# Patient Record
Sex: MALE | Race: WHITE | NOT HISPANIC OR LATINO
[De-identification: names, ages, dates, MRNs, and addresses within clinical notes are randomized per-mention and may not be internally consistent; named-entity substitution may affect disease eponyms.]

---

## 2017-06-11 ENCOUNTER — TRANSCRIPTION ENCOUNTER (OUTPATIENT)
Age: 37
End: 2017-06-11

## 2017-07-22 ENCOUNTER — TRANSCRIPTION ENCOUNTER (OUTPATIENT)
Age: 37
End: 2017-07-22

## 2018-10-12 ENCOUNTER — APPOINTMENT (OUTPATIENT)
Dept: INTERNAL MEDICINE | Facility: CLINIC | Age: 38
End: 2018-10-12

## 2018-10-12 ENCOUNTER — OUTPATIENT (OUTPATIENT)
Dept: OUTPATIENT SERVICES | Facility: HOSPITAL | Age: 38
LOS: 1 days | Discharge: HOME | End: 2018-10-12

## 2018-10-12 VITALS — HEART RATE: 65 BPM | TEMPERATURE: 97.8 F | DIASTOLIC BLOOD PRESSURE: 67 MMHG | SYSTOLIC BLOOD PRESSURE: 105 MMHG

## 2018-10-12 VITALS — HEIGHT: 70 IN | WEIGHT: 146 LBS | BODY MASS INDEX: 20.9 KG/M2

## 2018-10-12 DIAGNOSIS — Z78.9 OTHER SPECIFIED HEALTH STATUS: ICD-10-CM

## 2018-10-12 DIAGNOSIS — Z86.59 PERSONAL HISTORY OF OTHER MENTAL AND BEHAVIORAL DISORDERS: ICD-10-CM

## 2018-10-12 NOTE — PHYSICAL EXAM
[No Acute Distress] : no acute distress [Supple] : supple [No Respiratory Distress] : no respiratory distress  [Clear to Auscultation] : lungs were clear to auscultation bilaterally [No Accessory Muscle Use] : no accessory muscle use [Normal Rate] : normal rate  [Regular Rhythm] : with a regular rhythm [Normal S1, S2] : normal S1 and S2 [No Edema] : there was no peripheral edema [Soft] : abdomen soft [Non Tender] : non-tender [de-identified] : bilateral shin scratching lesions

## 2018-10-12 NOTE — REVIEW OF SYSTEMS
[Fever] : no fever [Chills] : no chills [Nasal Discharge] : no nasal discharge [Sore Throat] : no sore throat [Chest Pain] : no chest pain [Palpitations] : no palpitations [Lower Ext Edema] : no lower extremity edema [Shortness Of Breath] : no shortness of breath [Cough] : no cough [Dyspnea on Exertion] : not dyspnea on exertion [Abdominal Pain] : no abdominal pain [Nausea] : no nausea [Constipation] : no constipation [Diarrhea] : no diarrhea [Vomiting] : no vomiting [Heartburn] : no heartburn [Melena] : no melena [Dysuria] : no dysuria [Hematuria] : no hematuria [Itching] : itching [Negative] : Neurological [de-identified] : skin lesions on lower extr.

## 2018-10-12 NOTE — ASSESSMENT
[FreeTextEntry1] : 38 y.o male patient with PMH of mental retardation and no other issues presents to the office for a routine exam.\par \par #Pt has MR, paperwork needed for program\par - Check cbc, cmp, lipid profile, quantiferon, urinalysis, hepatitis B serology\par - Mother instructed to come back in 1 week to complete form\par \par # Skin lesions on bilateral shins\par - Patient scratches a lot\par - Will start clobetasole cream\par \par # Health maintenance\par - Patient refused flu vaccine\par - Patient up to date on tetanus vaccine

## 2018-10-15 LAB
ALBUMIN SERPL ELPH-MCNC: 5 G/DL
ALP BLD-CCNC: 89 U/L
ALT SERPL-CCNC: 11 U/L
ANION GAP SERPL CALC-SCNC: 15 MMOL/L
APPEARANCE: CLEAR
AST SERPL-CCNC: 17 U/L
BASOPHILS # BLD AUTO: 0.02 K/UL
BASOPHILS NFR BLD AUTO: 0.6 %
BILIRUB SERPL-MCNC: 0.6 MG/DL
BILIRUBIN URINE: NEGATIVE
BLOOD URINE: NEGATIVE
BUN SERPL-MCNC: 9 MG/DL
CALCIUM SERPL-MCNC: 9.6 MG/DL
CHLORIDE SERPL-SCNC: 101 MMOL/L
CHOLEST SERPL-MCNC: 163 MG/DL
CHOLEST/HDLC SERPL: 3.5 RATIO
CO2 SERPL-SCNC: 26 MMOL/L
COLOR: YELLOW
CREAT SERPL-MCNC: 0.8 MG/DL
EOSINOPHIL # BLD AUTO: 0.04 K/UL
EOSINOPHIL NFR BLD AUTO: 1.2 %
GLUCOSE QUALITATIVE U: NEGATIVE MG/DL
GLUCOSE SERPL-MCNC: 89 MG/DL
HBV CORE IGG+IGM SER QL: NONREACTIVE
HBV SURFACE AB SER QL: REACTIVE
HCT VFR BLD CALC: 44.2 %
HDLC SERPL-MCNC: 47 MG/DL
HGB BLD-MCNC: 15 G/DL
IMM GRANULOCYTES NFR BLD AUTO: 0 %
KETONES URINE: NEGATIVE
LDLC SERPL CALC-MCNC: 110 MG/DL
LEUKOCYTE ESTERASE URINE: NEGATIVE
LYMPHOCYTES # BLD AUTO: 1.04 K/UL
LYMPHOCYTES NFR BLD AUTO: 31.4 %
MAN DIFF?: NORMAL
MCHC RBC-ENTMCNC: 31.1 PG
MCHC RBC-ENTMCNC: 33.9 G/DL
MCV RBC AUTO: 91.5 FL
MONOCYTES # BLD AUTO: 0.35 K/UL
MONOCYTES NFR BLD AUTO: 10.6 %
NEUTROPHILS # BLD AUTO: 1.86 K/UL
NEUTROPHILS NFR BLD AUTO: 56.2 %
NITRITE URINE: NEGATIVE
PH URINE: 7
PLATELET # BLD AUTO: 238 K/UL
POTASSIUM SERPL-SCNC: 4.2 MMOL/L
PROT SERPL-MCNC: 7.7 G/DL
PROTEIN URINE: NEGATIVE MG/DL
RBC # BLD: 4.83 M/UL
RBC # FLD: 13.6 %
SODIUM SERPL-SCNC: 142 MMOL/L
SPECIFIC GRAVITY URINE: 1.01
TRIGL SERPL-MCNC: 77 MG/DL
UROBILINOGEN URINE: 0.2 MG/DL (ref 0.2–?)
WBC # FLD AUTO: 3.31 K/UL

## 2018-10-15 RX ORDER — CLOBETASOL PROPIONATE 0.5 MG/G
0.05 CREAM TOPICAL TWICE DAILY
Qty: 1 | Refills: 1 | Status: DISCONTINUED | COMMUNITY
Start: 2018-10-12 | End: 2018-10-15

## 2018-10-15 RX ORDER — HALOBETASOL PROPIONATE 0.5 MG/G
0.05 CREAM TOPICAL TWICE DAILY
Qty: 1 | Refills: 3 | Status: ACTIVE | COMMUNITY
Start: 2018-10-15 | End: 1900-01-01

## 2018-10-19 ENCOUNTER — OUTPATIENT (OUTPATIENT)
Dept: OUTPATIENT SERVICES | Facility: HOSPITAL | Age: 38
LOS: 1 days | Discharge: HOME | End: 2018-10-19

## 2018-10-19 ENCOUNTER — APPOINTMENT (OUTPATIENT)
Dept: INTERNAL MEDICINE | Facility: CLINIC | Age: 38
End: 2018-10-19

## 2018-10-19 VITALS
TEMPERATURE: 96.8 F | HEIGHT: 70 IN | WEIGHT: 149 LBS | BODY MASS INDEX: 21.33 KG/M2 | SYSTOLIC BLOOD PRESSURE: 110 MMHG | DIASTOLIC BLOOD PRESSURE: 73 MMHG

## 2018-10-19 DIAGNOSIS — L98.9 DISORDER OF THE SKIN AND SUBCUTANEOUS TISSUE, UNSPECIFIED: ICD-10-CM

## 2018-10-19 DIAGNOSIS — F79 UNSPECIFIED INTELLECTUAL DISABILITIES: ICD-10-CM

## 2018-10-19 NOTE — PHYSICAL EXAM
[No Acute Distress] : no acute distress [Well Nourished] : well nourished [Normal Sclera/Conjunctiva] : normal sclera/conjunctiva [PERRL] : pupils equal round and reactive to light [Normal Outer Ear/Nose] : the outer ears and nose were normal in appearance [Supple] : supple [No Respiratory Distress] : no respiratory distress  [Clear to Auscultation] : lungs were clear to auscultation bilaterally [Normal Rate] : normal rate  [Regular Rhythm] : with a regular rhythm [Normal S1, S2] : normal S1 and S2 [No Edema] : there was no peripheral edema [No Extremity Clubbing/Cyanosis] : no extremity clubbing/cyanosis [Soft] : abdomen soft [Non Tender] : non-tender [No Joint Swelling] : no joint swelling [Grossly Normal Strength/Tone] : grossly normal strength/tone [Normal Gait] : normal gait [Coordination Grossly Intact] : coordination grossly intact [de-identified] : mildly distended [de-identified] : rash+ left LE- reddish-purple discoloration, no discharge [de-identified] : mental retardation

## 2018-10-19 NOTE — HISTORY OF PRESENT ILLNESS
[FreeTextEntry1] : follow up to fill form [de-identified] : 39 yo male with PMHx of mental retardation , comes for a follow up for the paperwork to be completed to be enrolled in the 'special program'. \par No new complaints today.

## 2018-10-19 NOTE — ASSESSMENT
[FreeTextEntry1] : 38 y.o male patient with PMH of mental retardation and no other issues presents to the office for a routine exam.\par \par #Pt has MR, paperwork needed for program\par -cbc, cmp, lipid profile checked\par -hep b surface ab -reactive; core ab nonreactive-immune to hep B\par -quantiferon test wasn't done by lab;ordered again\par \par # Skin lesions on bilateral shins\par -better than before-not itchy anymore\par - c/w halobetazole cream\par \par # Health maintenance\par - Patient refused flu vaccine\par - Patient up to date on tetanus vaccine. \par

## 2018-10-22 LAB
M TB IFN-G BLD-IMP: NEGATIVE
QUANTIFERON TB PLUS MITOGEN MINUS NIL: >10 IU/ML
QUANTIFERON TB PLUS NIL: 0.05 IU/ML
QUANTIFERON TB PLUS TB1 MINUS NIL: -0.02 IU/ML
QUANTIFERON TB PLUS TB2 MINUS NIL: -0.02 IU/ML

## 2019-02-10 ENCOUNTER — EMERGENCY (EMERGENCY)
Facility: HOSPITAL | Age: 39
LOS: 0 days | Discharge: HOME | End: 2019-02-10
Attending: EMERGENCY MEDICINE

## 2019-02-10 VITALS
OXYGEN SATURATION: 97 % | DIASTOLIC BLOOD PRESSURE: 62 MMHG | HEART RATE: 119 BPM | RESPIRATION RATE: 20 BRPM | TEMPERATURE: 96 F | SYSTOLIC BLOOD PRESSURE: 95 MMHG

## 2019-02-10 LAB
ALBUMIN SERPL ELPH-MCNC: 4.3 G/DL — SIGNIFICANT CHANGE UP (ref 3.5–5.2)
ALP SERPL-CCNC: 80 U/L — SIGNIFICANT CHANGE UP (ref 30–115)
ALT FLD-CCNC: 11 U/L — SIGNIFICANT CHANGE UP (ref 0–41)
ANION GAP SERPL CALC-SCNC: 22 MMOL/L — HIGH (ref 7–14)
ANISOCYTOSIS BLD QL: SLIGHT — SIGNIFICANT CHANGE UP
AST SERPL-CCNC: 24 U/L — SIGNIFICANT CHANGE UP (ref 0–41)
BASOPHILS # BLD AUTO: 0 K/UL — SIGNIFICANT CHANGE UP (ref 0–0.2)
BASOPHILS NFR BLD AUTO: 0 % — SIGNIFICANT CHANGE UP (ref 0–1)
BILIRUB SERPL-MCNC: 1.2 MG/DL — SIGNIFICANT CHANGE UP (ref 0.2–1.2)
BUN SERPL-MCNC: 31 MG/DL — HIGH (ref 10–20)
CALCIUM SERPL-MCNC: 9.1 MG/DL — SIGNIFICANT CHANGE UP (ref 8.5–10.1)
CHLORIDE SERPL-SCNC: 93 MMOL/L — LOW (ref 98–110)
CO2 SERPL-SCNC: 23 MMOL/L — SIGNIFICANT CHANGE UP (ref 17–32)
CREAT SERPL-MCNC: 2.2 MG/DL — HIGH (ref 0.7–1.5)
EOSINOPHIL # BLD AUTO: 0 K/UL — SIGNIFICANT CHANGE UP (ref 0–0.7)
EOSINOPHIL NFR BLD AUTO: 0 % — SIGNIFICANT CHANGE UP (ref 0–8)
GIANT PLATELETS BLD QL SMEAR: PRESENT — SIGNIFICANT CHANGE UP
GLUCOSE SERPL-MCNC: 94 MG/DL — SIGNIFICANT CHANGE UP (ref 70–99)
HCT VFR BLD CALC: 42.6 % — SIGNIFICANT CHANGE UP (ref 42–52)
HGB BLD-MCNC: 15.5 G/DL — SIGNIFICANT CHANGE UP (ref 14–18)
LACTATE SERPL-SCNC: 3.2 MMOL/L — HIGH (ref 0.5–2.2)
LYMPHOCYTES # BLD AUTO: 0 % — LOW (ref 20.5–51.1)
LYMPHOCYTES # BLD AUTO: 0 K/UL — LOW (ref 1.2–3.4)
MANUAL SMEAR VERIFICATION: SIGNIFICANT CHANGE UP
MCHC RBC-ENTMCNC: 31.6 PG — HIGH (ref 27–31)
MCHC RBC-ENTMCNC: 36.4 G/DL — SIGNIFICANT CHANGE UP (ref 32–37)
MCV RBC AUTO: 86.9 FL — SIGNIFICANT CHANGE UP (ref 80–94)
MICROCYTES BLD QL: SLIGHT — SIGNIFICANT CHANGE UP
MONOCYTES # BLD AUTO: 0.24 K/UL — SIGNIFICANT CHANGE UP (ref 0.1–0.6)
MONOCYTES NFR BLD AUTO: 1.7 % — SIGNIFICANT CHANGE UP (ref 1.7–9.3)
NEUTROPHILS # BLD AUTO: 14.05 K/UL — HIGH (ref 1.4–6.5)
NEUTROPHILS NFR BLD AUTO: 98.3 % — HIGH (ref 42.2–75.2)
NRBC # BLD: 0 /100 WBCS — SIGNIFICANT CHANGE UP (ref 0–0)
PLAT MORPH BLD: NORMAL — SIGNIFICANT CHANGE UP
PLATELET # BLD AUTO: 172 K/UL — SIGNIFICANT CHANGE UP (ref 130–400)
POLYCHROMASIA BLD QL SMEAR: SLIGHT — SIGNIFICANT CHANGE UP
POTASSIUM SERPL-MCNC: 4 MMOL/L — SIGNIFICANT CHANGE UP (ref 3.5–5)
POTASSIUM SERPL-SCNC: 4 MMOL/L — SIGNIFICANT CHANGE UP (ref 3.5–5)
PROT SERPL-MCNC: 7.3 G/DL — SIGNIFICANT CHANGE UP (ref 6–8)
RBC # BLD: 4.9 M/UL — SIGNIFICANT CHANGE UP (ref 4.7–6.1)
RBC # FLD: 13 % — SIGNIFICANT CHANGE UP (ref 11.5–14.5)
RBC BLD AUTO: NORMAL — SIGNIFICANT CHANGE UP
SODIUM SERPL-SCNC: 138 MMOL/L — SIGNIFICANT CHANGE UP (ref 135–146)
WBC # BLD: 14.29 K/UL — HIGH (ref 4.8–10.8)
WBC # FLD AUTO: 14.29 K/UL — HIGH (ref 4.8–10.8)

## 2019-02-10 RX ORDER — ONDANSETRON 8 MG/1
4 TABLET, FILM COATED ORAL ONCE
Qty: 0 | Refills: 0 | Status: COMPLETED | OUTPATIENT
Start: 2019-02-10 | End: 2019-02-10

## 2019-02-10 RX ORDER — SODIUM CHLORIDE 9 MG/ML
2000 INJECTION, SOLUTION INTRAVENOUS ONCE
Qty: 0 | Refills: 0 | Status: COMPLETED | OUTPATIENT
Start: 2019-02-10 | End: 2019-02-10

## 2019-02-10 RX ORDER — FAMOTIDINE 10 MG/ML
20 INJECTION INTRAVENOUS ONCE
Qty: 0 | Refills: 0 | Status: COMPLETED | OUTPATIENT
Start: 2019-02-10 | End: 2019-02-10

## 2019-02-10 RX ORDER — ACETAMINOPHEN 500 MG
650 TABLET ORAL ONCE
Qty: 0 | Refills: 0 | Status: COMPLETED | OUTPATIENT
Start: 2019-02-10 | End: 2019-02-10

## 2019-02-10 RX ADMIN — ONDANSETRON 4 MILLIGRAM(S): 8 TABLET, FILM COATED ORAL at 21:20

## 2019-02-10 RX ADMIN — FAMOTIDINE 104 MILLIGRAM(S): 10 INJECTION INTRAVENOUS at 21:20

## 2019-02-10 RX ADMIN — SODIUM CHLORIDE 2000 MILLILITER(S): 9 INJECTION, SOLUTION INTRAVENOUS at 21:20

## 2019-02-10 RX ADMIN — Medication 650 MILLIGRAM(S): at 21:20

## 2019-02-10 NOTE — ED PROVIDER NOTE - OBJECTIVE STATEMENT
37 yo male with no significant PMHx ? mental disability presents for vomiting, watery diarrhea x 2 days. Patient's mother at bedside giving history. Patient has not been able to tolerate PO and has been gagging with minimal vomiting. Patient has been having continuing diarrhea as well. (+) fevers, sore throat, generalized weakness. Patient/family denies chest pain, SOB, abdominal pain, dizziness, leg pain/swelling, urinary sx, cough, congestion, changes in urine output, changes in mental status.

## 2019-02-10 NOTE — ED PROVIDER NOTE - NSFOLLOWUPINSTRUCTIONS_ED_ALL_ED_FT
Follow up with your primary care doctor in 48 hours for re-evaluation and further treatment.    Dehydration, Adult    Dehydration is a condition in which you do not have enough fluid or water in your body. It happens when you take in less fluid than you lose. Vital organs such as the kidneys, brain, and heart cannot function without a proper amount of fluids. Any loss of fluids from the body can cause dehydration.     Dehydration can range from mild to severe. This condition should be treated right away to help prevent it from becoming severe.     CAUSES  This condition may be caused by:    Vomiting.  Diarrhea.  Excessive sweating, such as when exercising in hot or humid weather.  Not drinking enough fluid during strenuous exercise or during an illness.  Excessive urine output.  Fever.   Certain medicines.    RISK FACTORS  This condition is more likely to develop in:    People who are taking certain medicines that cause the body to lose excess fluid (diuretics).    People who have a chronic illness, such as diabetes, that may increase urination.   Older adults.    People who live at high altitudes.    People who participate in endurance sports.      SYMPTOMS  Mild Dehydration    Thirst.  Dry lips.  Slightly dry mouth.  Dry, warm skin.     Moderate Dehydration    Very dry mouth.    Muscle cramps.    Dark urine and decreased urine production.    Decreased tear production.    Headache.    Light-headedness, especially when you stand up from a sitting position.      Severe Dehydration    Changes in skin.    Cold and clammy skin.    Skin does not spring back quickly when lightly pinched and released.    Changes in body fluids.    Extreme thirst.    No tears.    Not able to sweat when body temperature is high, such as in hot weather.    Minimal urine production.    Changes in vital signs.    Rapid, weak pulse (more than 100 beats per minute when you are sitting still).    Rapid breathing.    Low blood pressure.    Other changes.    Sunken eyes.    Cold hands and feet.    Confusion.   Lethargy and difficulty being awakened.   Fainting (syncope).    Short-term weight loss.    Unconsciousness.     DIAGNOSIS  This condition may be diagnosed based on your symptoms. You may also have tests to determine how severe your dehydration is. These tests may include:     Urine tests.    Blood tests.      TREATMENT  Treatment for this condition depends on the severity. Mild or moderate dehydration can often be treated at home. Treatment should be started right away. Do not wait until dehydration becomes severe. Severe dehydration needs to be treated at the hospital.    Treatment for Mild Dehydration    Drinking plenty of water to replace the fluid you have lost.    Replacing minerals in your blood (electrolytes) that you may have lost.      Treatment for Moderate Dehydration     Consuming oral rehydration solution (ORS).     Treatment for Severe Dehydration    Receiving fluid through an IV tube.    Receiving electrolyte solution through a feeding tube that is passed through your nose and into your stomach (nasogastric tube or NG tube).  Correcting any abnormalities in electrolytes.     HOME CARE INSTRUCTIONS  Drink enough fluid to keep your urine clear or pale yellow.    Drink water or fluid slowly by taking small sips. You can also try sucking on ice cubes.   Have food or beverages that contain electrolytes. Examples include bananas and sports drinks.  Take over-the-counter and prescription medicines only as told by your health care provider.    Prepare ORS according to the 's instructions. Take sips of ORS every 5 minutes until your urine returns to normal.  If you have vomiting or diarrhea, continue to try to drink water, ORS, or both.    If you have diarrhea, avoid:    Beverages that contain caffeine.    Fruit juice.    Milk.     Carbonated soft drinks.  Do not take salt tablets. This can lead to the condition of having too much sodium in your body (hypernatremia).      SEEK MEDICAL CARE IF:  You cannot eat or drink without vomiting.   You have had moderate diarrhea during a period of more than 24 hours.   You have a fever.    SEEK IMMEDIATE MEDICAL CARE IF:  You have extreme thirst.  You have severe diarrhea.   You have not urinated in 6–8 hours, or you have urinated only a small amount of very dark urine.  You have shriveled skin.  You are dizzy, confused, or both.    ADDITIONAL NOTES AND INSTRUCTIONS    Please follow up with your Primary MD in 24-48 hr.  Seek immediate medical care for any new/worsening signs or symptoms.     Nausea / Vomiting    Nausea is the feeling that you have an upset stomach or have to vomit. As nausea gets worse, it can lead to vomiting. Vomiting occurs when stomach contents are thrown up and out of the mouth which puts you at an increased risk for dehydration. Older adults and people with other diseases or a weak immune system are at higher risk for dehydration. Drink clear fluids in small but frequent amounts as tolerated. Eat bland, easy-to-digest foods in small amounts as tolerated.     SEEK IMMEDIATE MEDICAL CARE IF YOU HAVE THE FOLLOWING SYMPTOMS: fever, inability to keep fluids down, black or bloody vomitus, black or bloody stools, lightheadedness/dizziness, chest pain, severe headache, rash, shortness of breath, cold or clammy skin, confusion, pain with urination, or any signs of dehydration.

## 2019-02-10 NOTE — ED PROVIDER NOTE - PROGRESS NOTE DETAILS
Patient mother stated that patient is feeling lot better, and they want to go home. Patient mother stated that patient is not going to be able to stay in the hospital, states that he has been asking to go home, states he does not do well in the hospitals since it is new environment. Patient mother also stated that she cannot stay with him in the hospital due to her other  needs, patient kept extending his arm and attempting to pull out the IV while I was in the room taking to them, patient mother stated that it will be lot difficult to keep him in the hospital since he does not do well in the hospitals. Patient mother also stated that not only she care for another child, she also has her own health issues, is due to take her elaquis and other meds, states she needs to go home for her meds and to her other child, so she cannot stay in the hospital, and as per her, patient will not do well in the hospital. I have told them that I will have the sitter (1:1 observation) from our hospital stay and I will take care of the patient, and I advised patient mother to go home and take care of her health and her other  needs. Patient mother stated that patient will get agitated and won't stay in the hospital, so she want to take him home. Patient mother stated that she signs all the papers for patient for any of his needs, she is his mother, legal guardian and care giver, and she wanted to sign him out and take home. I have discussed with patient and his mother in detail that he has low BP, tachycardic, elevated Lactate with JACKSON, and I have educated them on the need for admission and further medical care, patient continued to attempting to pull out the hep lock in spite of encouraging him not to do that, and patient verbalized understanding the information and still wanted to sign out AMA. I have advised them to come back in AM to ED again for reevaluation and repeat labs, they verbalized understanding and stated will either come to ED for go to their doctor.

## 2019-02-10 NOTE — ED ADULT NURSE NOTE - OBJECTIVE STATEMENT
Pt is non-verbal at baseline due to cognitive deficit as per mother who states she brought pt in for multiple vomiting episodes

## 2019-02-10 NOTE — ED ADULT NURSE NOTE - NSIMPLEMENTINTERV_GEN_ALL_ED
Implemented All Universal Safety Interventions:  [place] to call system. Call bell, personal items and telephone within reach. Instruct patient to call for assistance. Room bathroom lighting operational. Non-slip footwear when patient is off stretcher. Physically safe environment: no spills, clutter or unnecessary equipment. Stretcher in lowest position, wheels locked, appropriate side rails in place.

## 2019-02-10 NOTE — ED PROVIDER NOTE - NS ED ROS FT
Constitutional: (+) fevers/chills.    Head: No injury, headache.    Eyes:  No eye pain or discharge. No injury.    ENMT:  No pain, discharge or infections. No neck pain or stiffness. No loss ROM.    Cardiac:  No chest pain, SOB or edema.    Respiratory:  No cough or respiratory distress.     GI:  (+) nausea, vomiting, diarrhea (-) abdominal pain. Decreased PO intake.    :  No frequency, urgency or burning. No change in urine output.    MS:  No leg pain, leg swelling, myalgia, muscle weakness, joint pain or back pain. No loss ROM.    Neuro:  No dizziness (+) generalized weakness.  No LOC.    Skin:  No skin rash.

## 2019-02-10 NOTE — ED PROVIDER NOTE - ATTENDING CONTRIBUTION TO CARE
patient is BIB mother who is next of kin and as per her, is legal guardian and care giver. Patient mother stated that 2 days ago patient started having nausea/vomiting/diarrhea; several episodes of watery diarrhea, no blood/mucous/pus in the stool, denies vomiting blood. NO travel, no sick contacts, subjective fever, no recent abx use.   vitals noted  patient is awake, alert, able to follow simple commands, able to answer simple questions by nodding head, as per patient mother, patient mental status is at his baseline.   +dry mucosa  lungs: CTA, no wheezing/crackles  Abd: +BS, NT, ND, soft  CNS: awake, alert, at his baseline  A/P: AGE  labs, IVF, symptomatic treatment  reevaluation

## 2019-02-10 NOTE — ED PROVIDER NOTE - PHYSICAL EXAMINATION
GEN: Well appearing, in no apparent distress. Slender.    HEAD:  Normocephalic, atraumatic.    EYES:  Clear conjunctivae without injection, drainage or discharge.    ENMT:  Dry MM.    NECK:  Supple, no masses. Normal ROM.    CARDIAC:  Tachycardic with regular rhythm, normal S1 and S2, no murmurs, rubs or gallops.    RESP:  Respiratory rate and effort appear normal; lungs are clear to auscultation bilaterally; no rhonchi, rales or wheezes.    ABDOMEN:  Soft, non-tender, non-distended, no masses. Normal BS throughout.    MUSCULOSKELETAL: No leg swelling, no calf tenderness. Patient able to ambulate without difficulty.  NEURO:  Patietn acting at baseline as per mother. Follows commands and answers questions with yes and no.     SKIN:  Normal skin color for age and race, well-perfused; warm and dry.

## 2019-02-11 ENCOUNTER — INPATIENT (INPATIENT)
Facility: HOSPITAL | Age: 39
LOS: 6 days | Discharge: HOME | End: 2019-02-18
Attending: HOSPITALIST | Admitting: HOSPITALIST
Payer: MEDICARE

## 2019-02-11 VITALS
OXYGEN SATURATION: 100 % | DIASTOLIC BLOOD PRESSURE: 59 MMHG | SYSTOLIC BLOOD PRESSURE: 97 MMHG | HEART RATE: 104 BPM | RESPIRATION RATE: 18 BRPM

## 2019-02-11 VITALS
HEART RATE: 122 BPM | DIASTOLIC BLOOD PRESSURE: 63 MMHG | OXYGEN SATURATION: 99 % | TEMPERATURE: 99 F | RESPIRATION RATE: 18 BRPM | SYSTOLIC BLOOD PRESSURE: 99 MMHG

## 2019-02-11 DIAGNOSIS — F79 UNSPECIFIED INTELLECTUAL DISABILITIES: ICD-10-CM

## 2019-02-11 DIAGNOSIS — E86.0 DEHYDRATION: ICD-10-CM

## 2019-02-11 DIAGNOSIS — S80.822A BLISTER (NONTHERMAL), LEFT LOWER LEG, INITIAL ENCOUNTER: ICD-10-CM

## 2019-02-11 DIAGNOSIS — E87.2 ACIDOSIS: ICD-10-CM

## 2019-02-11 DIAGNOSIS — R65.20 SEVERE SEPSIS WITHOUT SEPTIC SHOCK: ICD-10-CM
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
## 2019-02-11 DIAGNOSIS — L97.929 NON-PRESSURE CHRONIC ULCER OF UNSPECIFIED PART OF LEFT LOWER LEG WITH UNSPECIFIED SEVERITY: ICD-10-CM

## 2019-02-11 DIAGNOSIS — R00.0 TACHYCARDIA, UNSPECIFIED: ICD-10-CM

## 2019-02-11 DIAGNOSIS — L03.116 CELLULITIS OF LEFT LOWER LIMB: ICD-10-CM

## 2019-02-11 DIAGNOSIS — A41.9 SEPSIS, UNSPECIFIED ORGANISM: ICD-10-CM
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
## 2019-02-11 DIAGNOSIS — R11.10 VOMITING, UNSPECIFIED: ICD-10-CM

## 2019-02-11 DIAGNOSIS — E83.42 HYPOMAGNESEMIA: ICD-10-CM

## 2019-02-11 DIAGNOSIS — N17.9 ACUTE KIDNEY FAILURE, UNSPECIFIED: ICD-10-CM

## 2019-02-11 DIAGNOSIS — J02.0 STREPTOCOCCAL PHARYNGITIS: ICD-10-CM

## 2019-02-11 DIAGNOSIS — I95.9 HYPOTENSION, UNSPECIFIED: ICD-10-CM

## 2019-02-11 LAB
ALBUMIN SERPL ELPH-MCNC: 3.6 G/DL — SIGNIFICANT CHANGE UP (ref 3.5–5.2)
ALP SERPL-CCNC: 63 U/L — SIGNIFICANT CHANGE UP (ref 30–115)
ALT FLD-CCNC: 12 U/L — SIGNIFICANT CHANGE UP (ref 0–41)
ANION GAP SERPL CALC-SCNC: 20 MMOL/L — HIGH (ref 7–14)
AST SERPL-CCNC: 37 U/L — SIGNIFICANT CHANGE UP (ref 0–41)
BASE EXCESS BLDV CALC-SCNC: 1.9 MMOL/L — SIGNIFICANT CHANGE UP (ref -2–2)
BASOPHILS # BLD AUTO: 0 K/UL — SIGNIFICANT CHANGE UP (ref 0–0.2)
BASOPHILS NFR BLD AUTO: 0 % — SIGNIFICANT CHANGE UP (ref 0–1)
BILIRUB SERPL-MCNC: 0.9 MG/DL — SIGNIFICANT CHANGE UP (ref 0.2–1.2)
BUN SERPL-MCNC: 26 MG/DL — HIGH (ref 10–20)
CA-I SERPL-SCNC: 1.1 MMOL/L — LOW (ref 1.12–1.3)
CALCIUM SERPL-MCNC: 8.2 MG/DL — LOW (ref 8.5–10.1)
CHLORIDE SERPL-SCNC: 91 MMOL/L — LOW (ref 98–110)
CO2 SERPL-SCNC: 21 MMOL/L — SIGNIFICANT CHANGE UP (ref 17–32)
CREAT SERPL-MCNC: 1.4 MG/DL — SIGNIFICANT CHANGE UP (ref 0.7–1.5)
EOSINOPHIL # BLD AUTO: 0 K/UL — SIGNIFICANT CHANGE UP (ref 0–0.7)
EOSINOPHIL NFR BLD AUTO: 0 % — SIGNIFICANT CHANGE UP (ref 0–8)
GAS PNL BLDV: 135 MMOL/L — LOW (ref 136–145)
GAS PNL BLDV: SIGNIFICANT CHANGE UP
GLUCOSE SERPL-MCNC: 106 MG/DL — HIGH (ref 70–99)
HCO3 BLDV-SCNC: 27 MMOL/L — SIGNIFICANT CHANGE UP (ref 22–29)
HCT VFR BLDA CALC: 42.5 % — SIGNIFICANT CHANGE UP (ref 34–44)
HGB BLD CALC-MCNC: 13.9 G/DL — LOW (ref 14–18)
LACTATE BLDV-MCNC: 2.1 MMOL/L — HIGH (ref 0.5–1.6)
LACTATE SERPL-SCNC: 2 MMOL/L — SIGNIFICANT CHANGE UP (ref 0.5–2.2)
LYMPHOCYTES # BLD AUTO: 0.12 K/UL — LOW (ref 1.2–3.4)
LYMPHOCYTES # BLD AUTO: 0.9 % — LOW (ref 20.5–51.1)
MANUAL SMEAR VERIFICATION: SIGNIFICANT CHANGE UP
MONOCYTES # BLD AUTO: 0.23 K/UL — SIGNIFICANT CHANGE UP (ref 0.1–0.6)
MONOCYTES NFR BLD AUTO: 1.7 % — SIGNIFICANT CHANGE UP (ref 1.7–9.3)
NEUTROPHILS # BLD AUTO: 12.96 K/UL — HIGH (ref 1.4–6.5)
NEUTROPHILS NFR BLD AUTO: 93.9 % — HIGH (ref 42.2–75.2)
NEUTS BAND # BLD: 2.6 % — SIGNIFICANT CHANGE UP (ref 0–6)
PCO2 BLDV: 43 MMHG — SIGNIFICANT CHANGE UP (ref 41–51)
PH BLDV: 7.4 — SIGNIFICANT CHANGE UP (ref 7.26–7.43)
PLAT MORPH BLD: NORMAL — SIGNIFICANT CHANGE UP
PO2 BLDV: 20 MMHG — SIGNIFICANT CHANGE UP (ref 20–40)
POTASSIUM BLDV-SCNC: 3.9 MMOL/L — SIGNIFICANT CHANGE UP (ref 3.3–5.6)
POTASSIUM SERPL-MCNC: 4.2 MMOL/L — SIGNIFICANT CHANGE UP (ref 3.5–5)
POTASSIUM SERPL-SCNC: 4.2 MMOL/L — SIGNIFICANT CHANGE UP (ref 3.5–5)
PROT SERPL-MCNC: 6.2 G/DL — SIGNIFICANT CHANGE UP (ref 6–8)
RBC BLD AUTO: NORMAL — SIGNIFICANT CHANGE UP
SAO2 % BLDV: 41 % — SIGNIFICANT CHANGE UP
SODIUM SERPL-SCNC: 132 MMOL/L — LOW (ref 135–146)
VARIANT LYMPHS # BLD: 0.9 % — SIGNIFICANT CHANGE UP (ref 0–5)
WBC # BLD: 13.43 K/UL — HIGH (ref 4.8–10.8)
WBC # FLD AUTO: 13.43 K/UL — HIGH (ref 4.8–10.8)

## 2019-02-11 RX ORDER — ACETAMINOPHEN 500 MG
650 TABLET ORAL ONCE
Qty: 0 | Refills: 0 | Status: COMPLETED | OUTPATIENT
Start: 2019-02-11 | End: 2019-02-11

## 2019-02-11 RX ORDER — VANCOMYCIN HCL 1 G
750 VIAL (EA) INTRAVENOUS EVERY 12 HOURS
Qty: 0 | Refills: 0 | Status: DISCONTINUED | OUTPATIENT
Start: 2019-02-11 | End: 2019-02-13

## 2019-02-11 RX ORDER — DEXAMETHASONE 0.5 MG/5ML
10 ELIXIR ORAL ONCE
Qty: 0 | Refills: 0 | Status: COMPLETED | OUTPATIENT
Start: 2019-02-11 | End: 2019-02-11

## 2019-02-11 RX ORDER — SODIUM CHLORIDE 9 MG/ML
2000 INJECTION, SOLUTION INTRAVENOUS ONCE
Refills: 0 | Status: COMPLETED | OUTPATIENT
Start: 2019-02-11 | End: 2019-02-11

## 2019-02-11 RX ORDER — HEPARIN SODIUM 5000 [USP'U]/ML
5000 INJECTION INTRAVENOUS; SUBCUTANEOUS EVERY 12 HOURS
Qty: 0 | Refills: 0 | Status: DISCONTINUED | OUTPATIENT
Start: 2019-02-11 | End: 2019-02-18

## 2019-02-11 RX ORDER — PANTOPRAZOLE SODIUM 20 MG/1
40 TABLET, DELAYED RELEASE ORAL
Qty: 0 | Refills: 0 | Status: DISCONTINUED | OUTPATIENT
Start: 2019-02-11 | End: 2019-02-18

## 2019-02-11 RX ORDER — ONDANSETRON 8 MG/1
4 TABLET, FILM COATED ORAL EVERY 6 HOURS
Qty: 0 | Refills: 0 | Status: DISCONTINUED | OUTPATIENT
Start: 2019-02-11 | End: 2019-02-18

## 2019-02-11 RX ORDER — SODIUM CHLORIDE 9 MG/ML
1000 INJECTION INTRAMUSCULAR; INTRAVENOUS; SUBCUTANEOUS ONCE
Qty: 0 | Refills: 0 | Status: COMPLETED | OUTPATIENT
Start: 2019-02-11 | End: 2019-02-11

## 2019-02-11 RX ORDER — NYSTATIN 500MM UNIT
500000 POWDER (EA) MISCELLANEOUS
Qty: 0 | Refills: 0 | Status: DISCONTINUED | OUTPATIENT
Start: 2019-02-11 | End: 2019-02-18

## 2019-02-11 RX ORDER — DEXAMETHASONE 0.5 MG/5ML
4 ELIXIR ORAL EVERY 8 HOURS
Qty: 0 | Refills: 0 | Status: DISCONTINUED | OUTPATIENT
Start: 2019-02-11 | End: 2019-02-13

## 2019-02-11 RX ORDER — ACETAMINOPHEN 500 MG
650 TABLET ORAL EVERY 6 HOURS
Qty: 0 | Refills: 0 | Status: DISCONTINUED | OUTPATIENT
Start: 2019-02-11 | End: 2019-02-18

## 2019-02-11 RX ORDER — ONDANSETRON 8 MG/1
4 TABLET, FILM COATED ORAL ONCE
Refills: 0 | Status: COMPLETED | OUTPATIENT
Start: 2019-02-11 | End: 2019-02-11

## 2019-02-11 RX ORDER — SODIUM CHLORIDE 9 MG/ML
2000 INJECTION, SOLUTION INTRAVENOUS ONCE
Qty: 0 | Refills: 0 | Status: COMPLETED | OUTPATIENT
Start: 2019-02-11 | End: 2019-02-11

## 2019-02-11 RX ORDER — SODIUM CHLORIDE 9 MG/ML
1000 INJECTION, SOLUTION INTRAVENOUS
Qty: 0 | Refills: 0 | Status: DISCONTINUED | OUTPATIENT
Start: 2019-02-11 | End: 2019-02-13

## 2019-02-11 RX ORDER — AMPICILLIN SODIUM AND SULBACTAM SODIUM 250; 125 MG/ML; MG/ML
3 INJECTION, POWDER, FOR SUSPENSION INTRAMUSCULAR; INTRAVENOUS ONCE
Qty: 0 | Refills: 0 | Status: COMPLETED | OUTPATIENT
Start: 2019-02-11 | End: 2019-02-11

## 2019-02-11 RX ORDER — MEROPENEM 1 G/30ML
1000 INJECTION INTRAVENOUS EVERY 12 HOURS
Qty: 0 | Refills: 0 | Status: DISCONTINUED | OUTPATIENT
Start: 2019-02-11 | End: 2019-02-13

## 2019-02-11 RX ADMIN — AMPICILLIN SODIUM AND SULBACTAM SODIUM 200 GRAM(S): 250; 125 INJECTION, POWDER, FOR SUSPENSION INTRAMUSCULAR; INTRAVENOUS at 20:46

## 2019-02-11 RX ADMIN — ONDANSETRON 4 MILLIGRAM(S): 8 TABLET, FILM COATED ORAL at 18:49

## 2019-02-11 RX ADMIN — Medication 10 MILLIGRAM(S): at 23:40

## 2019-02-11 RX ADMIN — SODIUM CHLORIDE 2000 MILLILITER(S): 9 INJECTION, SOLUTION INTRAVENOUS at 18:48

## 2019-02-11 RX ADMIN — SODIUM CHLORIDE 2000 MILLILITER(S): 9 INJECTION, SOLUTION INTRAVENOUS at 20:20

## 2019-02-11 RX ADMIN — Medication 650 MILLIGRAM(S): at 20:10

## 2019-02-11 NOTE — H&P ADULT - ENMT COMMENTS
erythematous/ edematous tonsils and uvula with white patches, patent airway, +oral thrush, no trismus

## 2019-02-11 NOTE — ED PROVIDER NOTE - OBJECTIVE STATEMENT
37yo M with PMHx intellectual disability 39yo M with PMHx intellectual disability, brought to ED by family for eval for fever. Patient was seen in ED yesterday for vomiting, diarrhea, dehydration, tachycardic and hypotensive at that time. Per provider note had wanted to admit patient but family declined stating he has needed 1:1 before and does not do well in hospitals. Today patient continued to worsen, family took pt to [facility], did rapid strep test which was positive, but told family to go to ED again for tachycardia and hypotension. Patient unable to provide history and ROS 2/2 intellectual disability, history is per mother. Vomiting today has resolved but pt continuing to have poor PO intake and now has cough. Also notes chronic rash to left leg.

## 2019-02-11 NOTE — H&P ADULT - ASSESSMENT
39 yo M with intellectual disability p/w fever, sore throat, vomiting and decreased po intake     - Severe sepsis 2/2 strept pharyngitis and LLE cellulitis  ICU monitoring  IV abxs: vancomycin and clindamycin  IV hydration  start levophed if MAP <65 mmHg  fu blood cultures and de escalate accordingly  nystatin oral solution  decadron for uvular edema     - JACKSON likely 2/2 dehydration  crea 2.2 - 1.4      baseline 0.8  IVFs  avoid nephrotoxic medications        - Vomiting  zofran prn  full liquid diet then advance as tolerated     - GI/ DVT ppx     - Full code as per mother 39 yo M with intellectual disability p/w fever, sore throat, vomiting and decreased po intake     - Severe sepsis 2/2 strept pharyngitis and LLE cellulitis  ICU monitoring  IV abxs: vancomycin and clindamycin  IV hydration  start levophed if MAP <65 mmHg  fu blood cultures and de escalate accordingly  nystatin oral solution  decadron for uvular edema  burn consult for LLE blister      - JACKSON likely 2/2 dehydration  crea 2.2 - 1.4      baseline 0.8  IVFs  avoid nephrotoxic medications      - Vomiting  zofran prn  full liquid diet then advance as tolerated     - GI/ DVT ppx     - Full code

## 2019-02-11 NOTE — H&P ADULT - HISTORY OF PRESENT ILLNESS
37 yo M with intellectual disability brought in by family from home for hypotension and fever.  Pt was seen 1 day prior in the ED for vomiting and dehydration. he was diagnosed with strep pharyngitis.  pt was seen earlier today in  and was advised to come to ED given his low BP and tachycardia.  In Ed. tmax 101.9,  and ADALBERTO 80s/50s, given 4L IVFs and unasyn  Mother over phone deny any chills, complaints of sore throat, + intermittent cough, + decreased PO intake, + vomiting  + L leg skin rash since Oct that failed local creams 2/2 persistent scratching by pt

## 2019-02-11 NOTE — ED PROVIDER NOTE - PROGRESS NOTE DETAILS
d/w family. Last time pt had to be admitted, needed 1:1 as he became restless and agitated. Spoke with Pulm fellow Dr. Sd Mederos, to be admit to ICU. Will place 2nd 18G IV as pt likely unable to tolerate CVC placement without sedation.

## 2019-02-11 NOTE — ED PROVIDER NOTE - CARE PLAN
Principal Discharge DX:	Sepsis  Secondary Diagnosis:	Uvulitis  Secondary Diagnosis:	Pharyngitis Principal Discharge DX:	Sepsis  Secondary Diagnosis:	Uvulitis  Secondary Diagnosis:	Pharyngitis  Secondary Diagnosis:	Cellulitis

## 2019-02-11 NOTE — ED PROVIDER NOTE - NS ED ROS FT
Constitutional: +fever  ENMT:  No neck pain  Cardiac:  No chest pain  Respiratory:  +cough. No SOB  GI:  +vomiting, diarrhea. No abdominal pain.  :  No dysuria  MS:  No back pain.  Neuro:  No headache  Skin:  +rash  Endocrine: No history of thyroid disease or diabetes.  Except as documented in the HPI,  all other systems are negative.

## 2019-02-11 NOTE — ED PROVIDER NOTE - PHYSICAL EXAMINATION
Vital signs reviewed  GENERAL: Patient nontoxic appearing, NAD  HEAD: NCAT  EYES: Anicteric  ENT: MMM. Pharyngeal erythema with exudates. Edematous and enlarged uvula.   NECK: Supple, non tender  RESPIRATORY: Normal respiratory effort. CTA B/L. No wheezing, rales, rhonchi  CARDIOVASCULAR: Regular rate and rhythm  ABDOMEN: Soft. Nondistended. Nontender. No guarding or rebound. No CVA tenderness.  MUSCULOSKELETAL/EXTREMITIES: Brisk cap refill. 2+ radial pulses.  SKIN:  LLE blister  NEURO: Awake, alert. Speaking but does not answer questions coherently. Moving all extremities.   PSYCHIATRIC: Cooperative. Affect appropriate.

## 2019-02-11 NOTE — ED ADULT NURSE NOTE - OBJECTIVE STATEMENT
Pt sent in from [facility] for positive strep test and dehydration. Pt was seen in ED yesterday for same symptoms and left AMA and followed up in [facility] today. Pt mom and sister endorses pt has had fevers, chills, nausea, and decreased PO intake.

## 2019-02-11 NOTE — H&P ADULT - NSHPLABSRESULTS_GEN_ALL_CORE
13.4   13.43 )-----------( 143      ( 11 Feb 2019 18:09 )             37.5     02-11    132<L>  |  91<L>  |  26<H>  ----------------------------<  106<H>                crea 2.2  feb 10  4.2   |  21  |  1.4    Ca    8.2<L>      11 Feb 2019 18:09    TPro  6.2  /  Alb  3.6  /  TBili  0.9  /  DBili  x   /  AST  37  /  ALT  12  /  AlkPhos  63  02-11    Lactate, Blood: 2.0 mmol/L (02.11.19 @ 18:09)    Blood Gas Profile - Venous (02.10.19 @ 22:29)    pH, Venous: 7.40    pCO2, Venous: 43 mmHg    pO2, Venous: 20 mmHg    HCO3, Venous: 27 mmoL/L    Base Excess, Venous: 1.9 mmoL/L    Oxygen Saturation, Venous: 41 %    CXR: no opacities/ effusions ( official report)    bl cx: Pending    urine cx/ UA pending

## 2019-02-11 NOTE — ED PROVIDER NOTE - ATTENDING CONTRIBUTION TO CARE
38 year old male, pmhx of developmental delay, is bib mother for evaluation of weakness, sore throat and congestion, patient here yesterday, family decided to sign out ama. Patient is not a reliable historian. I am unable to obtain a comprehensive history, review of systems, past medical history, and/or physical exam due to constraints imposed by the urgency of the patient's clinical condition and/or mental status. Family bedside providing history.     CONSTITUTIONAL: Well-developed; well-nourished; in no acute distress. Sitting up, permitting Physical exam  SKIN: skin exam is warm and dry, no acute rash.  HEAD: Normocephalic; atraumatic.  EYES: PERRL, 3 mm bilateral, no nystagmus, EOM intact; conjunctiva and sclera clear.  ENT: + Pharyngeal erythema, no exudate, no edema, no pooling of secretions, + clear bilateral nasal discharge; airway clear.  NECK: Supple; non tender. + full passive ROM in all directions. No JVD  CARD: S1, S2 normal; no murmurs, gallops, or rubs. Regular rate and rhythm. + Symmetric Strong Pulses  RESP: No wheezes, rales or rhonchi. Good air movement bilaterally  ABD: soft; non-distended; non-tender. No Rebound, No Guarding, No signs of peritonitis, No CVA tenderness. No pulsatile abdominal mass. + Strong and Symmetric Pulses  EXT: Normal ROM. No clubbing, cyanosis or edema. Dp and Pt Pulses intact. Cap refill less than 3 seconds  NEURO: , Alert, mentating at baseline, grossly unremarkable. No Focal deficits. GCS 15. NIH 0

## 2019-02-11 NOTE — ED PROVIDER NOTE - CRITICAL CARE PROVIDED
interpretation of diagnostic studies/consultation with other physicians/documentation/consult w/ pt's family directly relating to pts condition/direct patient care (not related to procedure)

## 2019-02-11 NOTE — ED PROVIDER NOTE - MEDICAL DECISION MAKING DETAILS
I personally evaluated the patient. I reviewed the Resident’s or Physician Assistant’s note (as assigned above), and agree with the findings and plan except as documented in my note. Long discussion had with ICU fellow, family and ICU resident. Patient is agitated and will not tolerate an invasive procedure, family would like to avoid central access at this time, ICU team amenable to plan with careful watching, patient has bilateral 18 G IV, patient remains extremely well appearing clinically, patient HR much improved with fluids, accepted to the ICU, ICU residnet bedside evaluating patient, given antibiotics

## 2019-02-11 NOTE — H&P ADULT - EXTREMITIES COMMENTS
L leg 3x4cm blister with surrounding erythema, + TTP, no pus, no ulcers noted, pt refused full extremity exam

## 2019-02-12 ENCOUNTER — TRANSCRIPTION ENCOUNTER (OUTPATIENT)
Age: 39
End: 2019-02-12

## 2019-02-12 DIAGNOSIS — Z02.9 ENCOUNTER FOR ADMINISTRATIVE EXAMINATIONS, UNSPECIFIED: ICD-10-CM

## 2019-02-12 PROBLEM — R11.10 VOMITING, UNSPECIFIED: Chronic | Status: ACTIVE | Noted: 2019-02-10

## 2019-02-12 LAB
ANION GAP SERPL CALC-SCNC: 17 MMOL/L — HIGH (ref 7–14)
APPEARANCE UR: CLEAR — SIGNIFICANT CHANGE UP
BASOPHILS # BLD AUTO: 0.02 K/UL — SIGNIFICANT CHANGE UP (ref 0–0.2)
BASOPHILS NFR BLD AUTO: 0.2 % — SIGNIFICANT CHANGE UP (ref 0–1)
BILIRUB UR-MCNC: NEGATIVE — SIGNIFICANT CHANGE UP
BUN SERPL-MCNC: 17 MG/DL — SIGNIFICANT CHANGE UP (ref 10–20)
CALCIUM SERPL-MCNC: 8.2 MG/DL — LOW (ref 8.5–10.1)
CHLORIDE SERPL-SCNC: 97 MMOL/L — LOW (ref 98–110)
CO2 SERPL-SCNC: 24 MMOL/L — SIGNIFICANT CHANGE UP (ref 17–32)
COLOR SPEC: YELLOW — SIGNIFICANT CHANGE UP
CREAT SERPL-MCNC: 1 MG/DL — SIGNIFICANT CHANGE UP (ref 0.7–1.5)
DIFF PNL FLD: ABNORMAL
EOSINOPHIL # BLD AUTO: 0 K/UL — SIGNIFICANT CHANGE UP (ref 0–0.7)
EOSINOPHIL NFR BLD AUTO: 0 % — SIGNIFICANT CHANGE UP (ref 0–8)
GLUCOSE SERPL-MCNC: 119 MG/DL — HIGH (ref 70–99)
GLUCOSE UR QL: NEGATIVE MG/DL — SIGNIFICANT CHANGE UP
HCT VFR BLD CALC: 32.2 % — LOW (ref 42–52)
HGB BLD-MCNC: 11.8 G/DL — LOW (ref 14–18)
IMM GRANULOCYTES NFR BLD AUTO: 0.8 % — HIGH (ref 0.1–0.3)
KETONES UR-MCNC: ABNORMAL
LACTATE SERPL-SCNC: 1.1 MMOL/L — SIGNIFICANT CHANGE UP (ref 0.5–2.2)
LEUKOCYTE ESTERASE UR-ACNC: NEGATIVE — SIGNIFICANT CHANGE UP
LYMPHOCYTES # BLD AUTO: 0.13 K/UL — LOW (ref 1.2–3.4)
LYMPHOCYTES # BLD AUTO: 1.2 % — LOW (ref 20.5–51.1)
MAGNESIUM SERPL-MCNC: 1.4 MG/DL — LOW (ref 1.8–2.4)
MCHC RBC-ENTMCNC: 31.1 PG — HIGH (ref 27–31)
MCHC RBC-ENTMCNC: 36.6 G/DL — SIGNIFICANT CHANGE UP (ref 32–37)
MCV RBC AUTO: 84.7 FL — SIGNIFICANT CHANGE UP (ref 80–94)
MONOCYTES # BLD AUTO: 0.22 K/UL — SIGNIFICANT CHANGE UP (ref 0.1–0.6)
MONOCYTES NFR BLD AUTO: 2 % — SIGNIFICANT CHANGE UP (ref 1.7–9.3)
NEUTROPHILS # BLD AUTO: 10.48 K/UL — HIGH (ref 1.4–6.5)
NEUTROPHILS NFR BLD AUTO: 95.8 % — HIGH (ref 42.2–75.2)
NITRITE UR-MCNC: NEGATIVE — SIGNIFICANT CHANGE UP
NRBC # BLD: 0 /100 WBCS — SIGNIFICANT CHANGE UP (ref 0–0)
PH UR: 6 — SIGNIFICANT CHANGE UP (ref 5–8)
PLATELET # BLD AUTO: 112 K/UL — LOW (ref 130–400)
POTASSIUM SERPL-MCNC: 3.6 MMOL/L — SIGNIFICANT CHANGE UP (ref 3.5–5)
POTASSIUM SERPL-SCNC: 3.6 MMOL/L — SIGNIFICANT CHANGE UP (ref 3.5–5)
PROT UR-MCNC: ABNORMAL MG/DL
RBC # BLD: 3.8 M/UL — LOW (ref 4.7–6.1)
RBC # FLD: 12.5 % — SIGNIFICANT CHANGE UP (ref 11.5–14.5)
RBC CASTS # UR COMP ASSIST: ABNORMAL /HPF
SODIUM SERPL-SCNC: 138 MMOL/L — SIGNIFICANT CHANGE UP (ref 135–146)
SP GR SPEC: 1.01 — SIGNIFICANT CHANGE UP (ref 1.01–1.03)
UROBILINOGEN FLD QL: 1 MG/DL (ref 0.2–0.2)
WBC # BLD: 10.94 K/UL — HIGH (ref 4.8–10.8)
WBC # FLD AUTO: 10.94 K/UL — HIGH (ref 4.8–10.8)

## 2019-02-12 PROCEDURE — 93970 EXTREMITY STUDY: CPT | Mod: 26

## 2019-02-12 RX ORDER — MAGNESIUM SULFATE 500 MG/ML
2 VIAL (ML) INJECTION ONCE
Qty: 0 | Refills: 0 | Status: COMPLETED | OUTPATIENT
Start: 2019-02-12 | End: 2019-02-12

## 2019-02-12 RX ORDER — CHLORHEXIDINE GLUCONATE 213 G/1000ML
1 SOLUTION TOPICAL
Qty: 0 | Refills: 0 | Status: DISCONTINUED | OUTPATIENT
Start: 2019-02-12 | End: 2019-02-18

## 2019-02-12 RX ADMIN — Medication 4 MILLIGRAM(S): at 13:34

## 2019-02-12 RX ADMIN — Medication 250 MILLIGRAM(S): at 17:10

## 2019-02-12 RX ADMIN — Medication 500000 UNIT(S): at 17:00

## 2019-02-12 RX ADMIN — CHLORHEXIDINE GLUCONATE 1 APPLICATION(S): 213 SOLUTION TOPICAL at 11:46

## 2019-02-12 RX ADMIN — Medication 4 MILLIGRAM(S): at 05:05

## 2019-02-12 RX ADMIN — HEPARIN SODIUM 5000 UNIT(S): 5000 INJECTION INTRAVENOUS; SUBCUTANEOUS at 17:00

## 2019-02-12 RX ADMIN — MEROPENEM 100 MILLIGRAM(S): 1 INJECTION INTRAVENOUS at 05:03

## 2019-02-12 RX ADMIN — SODIUM CHLORIDE 100 MILLILITER(S): 9 INJECTION, SOLUTION INTRAVENOUS at 17:01

## 2019-02-12 RX ADMIN — Medication 500000 UNIT(S): at 05:04

## 2019-02-12 RX ADMIN — Medication 250 MILLIGRAM(S): at 05:41

## 2019-02-12 RX ADMIN — SODIUM CHLORIDE 100 MILLILITER(S): 9 INJECTION, SOLUTION INTRAVENOUS at 21:04

## 2019-02-12 RX ADMIN — Medication 500000 UNIT(S): at 23:20

## 2019-02-12 RX ADMIN — Medication 500000 UNIT(S): at 11:43

## 2019-02-12 RX ADMIN — Medication 4 MILLIGRAM(S): at 21:04

## 2019-02-12 RX ADMIN — PANTOPRAZOLE SODIUM 40 MILLIGRAM(S): 20 TABLET, DELAYED RELEASE ORAL at 08:11

## 2019-02-12 RX ADMIN — Medication 50 GRAM(S): at 08:12

## 2019-02-12 RX ADMIN — MEROPENEM 100 MILLIGRAM(S): 1 INJECTION INTRAVENOUS at 17:10

## 2019-02-12 NOTE — MEDICAL STUDENT PROGRESS NOTE(EDUCATION) - SUBJECTIVE AND OBJECTIVE BOX
SUBJECTIVE:    Patient is a 38y old Male who presents with a chief complaint of hypotension (2019 08:40)    Currently admitted to medicine with the primary diagnosis of Sepsis     Today is hospital day 1d. This morning he is resting comfortably in bed. Due to patient's disability, full subjective evaluation cannot be ascertained. Patient reports no new pains but continues to endorse pain in lower extremities bilaterally, worse on the LLE, and cough.     PAST MEDICAL & SURGICAL HISTORY  Vomiting, intractability of vomiting not specified, presence of nausea not specified, unspecified vomiting type  Intellectual disability  No significant past surgical history      MEDICATIONS:  STANDING MEDICATIONS  chlorhexidine 4% Liquid 1 Application(s) Topical <User Schedule>  dexamethasone  Injectable 4 milliGRAM(s) IV Push every 8 hours  heparin  Injectable 5000 Unit(s) SubCutaneous every 12 hours  lactated ringers. 1000 milliLiter(s) IV Continuous <Continuous>  meropenem  IVPB 1000 milliGRAM(s) IV Intermittent every 12 hours  nystatin    Suspension 559600 Unit(s) Oral four times a day  pantoprazole    Tablet 40 milliGRAM(s) Oral before breakfast  vancomycin  IVPB 750 milliGRAM(s) IV Intermittent every 12 hours    PRN MEDICATIONS  acetaminophen   Tablet .. 650 milliGRAM(s) Oral every 6 hours PRN  ondansetron Injectable 4 milliGRAM(s) IV Push every 6 hours PRN    VITALS:   T(F): 98  HR: 104  BP: 105/66  RR: 21  SpO2: 97%    LABS:                        11.8   10.94 )-----------( 112      ( 2019 04:35 )             32.2     02-12    138  |  97<L>  |  17  ----------------------------<  119<H>  3.6   |  24  |  1.0    Ca    8.2<L>      2019 04:35  Mg     1.4     02-12    TPro  6.2  /  Alb  3.6  /  TBili  0.9  /  DBili  x   /  AST  37  /  ALT  12  /  AlkPhos  63  02-11      Urinalysis Basic - ( 2019 01:00 )    Color: Yellow / Appearance: Clear / S.010 / pH: x  Gluc: x / Ketone: Trace  / Bili: Negative / Urobili: 1.0 mg/dL   Blood: x / Protein: Trace mg/dL / Nitrite: Negative   Leuk Esterase: Negative / RBC: 6-10 /HPF / WBC x   Sq Epi: x / Non Sq Epi: x / Bacteria: x        Lactate, Blood: 1.1 mmol/L (19 @ 04:35)  Lactate, Blood: 2.0 mmol/L (19 @ 18:09)          RADIOLOGY:    PHYSICAL EXAM:  GEN: No acute distress, comfortable,   HEENT: Erythematous posterior pharyngeal mucosa, postnasal drip  LUNGS: Clear to auscultation bilaterally   HEART: S1S2, RRR, no MRG  ABD: Soft, non-tender, non-distended.  EXT: LE tender b/l worse on left, erythematous and edematous lesion on LLE with excoriations  NEURO: AAOX1, not oriented to time or place    Intravenous access: PIV SUBJECTIVE:    Patient is a 38y old Male who presents with a chief complaint of hypotension (2019 08:40)    Currently admitted to medicine with the primary diagnosis of Sepsis     Today is hospital day 1d. This morning he is resting comfortably in bed. Due to patient's disability, full subjective evaluation cannot be ascertained. Patient reports no new pains but continues to endorse pain in lower extremities bilaterally, worse on the LLE, and cough.     PAST MEDICAL & SURGICAL HISTORY  Vomiting, intractability of vomiting not specified, presence of nausea not specified, unspecified vomiting type  Intellectual disability  No significant past surgical history      MEDICATIONS:  STANDING MEDICATIONS  chlorhexidine 4% Liquid 1 Application(s) Topical <User Schedule>  dexamethasone  Injectable 4 milliGRAM(s) IV Push every 8 hours  heparin  Injectable 5000 Unit(s) SubCutaneous every 12 hours  lactated ringers. 1000 milliLiter(s) IV Continuous <Continuous>  meropenem  IVPB 1000 milliGRAM(s) IV Intermittent every 12 hours  nystatin    Suspension 843500 Unit(s) Oral four times a day  pantoprazole    Tablet 40 milliGRAM(s) Oral before breakfast  vancomycin  IVPB 750 milliGRAM(s) IV Intermittent every 12 hours    PRN MEDICATIONS  acetaminophen   Tablet .. 650 milliGRAM(s) Oral every 6 hours PRN  ondansetron Injectable 4 milliGRAM(s) IV Push every 6 hours PRN    VITALS:   T(F): 98  HR: 104  BP: 105/66  RR: 21  SpO2: 97%    LABS:                        11.8   10.94 )-----------( 112      ( 2019 04:35 )             32.2     02-12    138  |  97<L>  |  17  ----------------------------<  119<H>  3.6   |  24  |  1.0    Ca    8.2<L>      2019 04:35  Mg     1.4     02-12    TPro  6.2  /  Alb  3.6  /  TBili  0.9  /  DBili  x   /  AST  37  /  ALT  12  /  AlkPhos  63  02-11      Urinalysis Basic - ( 2019 01:00 )    Color: Yellow / Appearance: Clear / S.010 / pH: x  Gluc: x / Ketone: Trace  / Bili: Negative / Urobili: 1.0 mg/dL   Blood: x / Protein: Trace mg/dL / Nitrite: Negative   Leuk Esterase: Negative / RBC: 6-10 /HPF / WBC x   Sq Epi: x / Non Sq Epi: x / Bacteria: x        Lactate, Blood: 1.1 mmol/L (19 @ 04:35)  Lactate, Blood: 2.0 mmol/L (19 @ 18:09)          RADIOLOGY:    CXR : No radiographic evidence of acute cardiopulmonary disease.      PHYSICAL EXAM:  GEN: No acute distress, comfortable,   HEENT: Erythematous posterior pharyngeal mucosa, postnasal drip  LUNGS: Clear to auscultation bilaterally   HEART: S1S2, RRR, no MRG  ABD: Soft, non-tender, non-distended.  EXT: LE tender b/l worse on left, erythematous and edematous lesion on LLE with excoriations  NEURO: AAOX1, not oriented to time or place    Intravenous access: PIV SUBJECTIVE:    Patient is a 38y old Male who presents with a chief complaint of hypotension (2019 08:40)    Currently admitted to medicine with the primary diagnosis of Sepsis     Today is hospital day 1d. This morning he is resting comfortably in bed. Due to patient's disability, full subjective evaluation cannot be ascertained. Patient reports no new pains but noted to be tender in lower extremities bilaterally, worse on the LLE, and cough.     PAST MEDICAL & SURGICAL HISTORY  Intellectual disability  No significant past surgical history      MEDICATIONS:  STANDING MEDICATIONS  chlorhexidine 4% Liquid 1 Application(s) Topical <User Schedule>  dexamethasone  Injectable 4 milliGRAM(s) IV Push every 8 hours  heparin  Injectable 5000 Unit(s) SubCutaneous every 12 hours  lactated ringers. 1000 milliLiter(s) IV Continuous <Continuous>  meropenem  IVPB 1000 milliGRAM(s) IV Intermittent every 12 hours  nystatin    Suspension 671422 Unit(s) Oral four times a day  pantoprazole    Tablet 40 milliGRAM(s) Oral before breakfast  vancomycin  IVPB 750 milliGRAM(s) IV Intermittent every 12 hours    PRN MEDICATIONS  acetaminophen   Tablet .. 650 milliGRAM(s) Oral every 6 hours PRN  ondansetron Injectable 4 milliGRAM(s) IV Push every 6 hours PRN    VITALS:   T(F): 98  HR: 104  BP: 105/66  RR: 21  SpO2: 97%    LABS:                        11.8   10.94 )-----------( 112      ( 2019 04:35 )             32.2     02-12    138  |  97<L>  |  17  ----------------------------<  119<H>  3.6   |  24  |  1.0    Ca    8.2<L>      2019 04:35  Mg     1.4     02-12    TPro  6.2  /  Alb  3.6  /  TBili  0.9  /  DBili  x   /  AST  37  /  ALT  12  /  AlkPhos  63  02-11      Urinalysis Basic - ( 2019 01:00 )    Color: Yellow / Appearance: Clear / S.010 / pH: x  Gluc: x / Ketone: Trace  / Bili: Negative / Urobili: 1.0 mg/dL   Blood: x / Protein: Trace mg/dL / Nitrite: Negative   Leuk Esterase: Negative / RBC: 6-10 /HPF / WBC x   Sq Epi: x / Non Sq Epi: x / Bacteria: x        Lactate, Blood: 1.1 mmol/L (19 @ 04:35)  Lactate, Blood: 2.0 mmol/L (19 @ 18:09)          RADIOLOGY:    CXR : No radiographic evidence of acute cardiopulmonary disease.      PHYSICAL EXAM:  GEN: No acute distress, comfortable,   HEENT: Erythematous posterior pharyngeal mucosa, small white patchy areas noted, no perfuse discharge   LUNGS: Clear to auscultation bilaterally   HEART: S1S2, RRR, no MRG  ABD: Soft, non-tender, non-distended.  EXT: LE tender b/l worse on left, erythematous, edematous with bullous lesion on LLE with excoriations, no purulent discharge   NEURO: AAOX1, not oriented to time or place    Intravenous access: PIV

## 2019-02-12 NOTE — CHART NOTE - NSCHARTNOTEFT_GEN_A_CORE
CCU Transfer Note    Transfer from: CCU    Transfer to: ( x ) Medicine    (  ) Telemetry    (  ) RCU                               (  ) Palliative    (  ) Stroke Unit    (  ) MICU    (  ) __________________    Accepting Physician: Med floor    Signout given to:     HPI / CCU COURSE:  37 yo M with intellectual disability brought in by mother from home for poor PO intake and fever. Patient seen in ED 2/10 for vomiting, dehydration. In H&P states strep pharyngitis but unable to find any results for strep+ swab. At that time mother stated she had other children to take care of and her own health to monitor, so discharged AMA. Patient noted then to have leukocytosis and lactic acid 2.1 but still d/c'd home. [facility] [place] [facility] and was told to come to ED given low BP and tachy. In ED pt was febrile to 101.9 and tachy to 123 with BP 80s/50s. Got x4L IVF and unasyn, started on cefepime and bhupendra. Mother continued to deny any chills, but does have sore throat, intermittent cough, decreased PO intake, and some vomiting. Also noted to have a L leg skin rash since Oct that failed local cream treatment 2/2 pt persistently scratching.     Admitted overnight to CCU for monitoring of breathing and low BP. Never required intubation or pressors, tolerating PO w/o difficulty. Mother went home and was not amenable to phone conversation overnight as per resident and RN. Patient is AAOx2 at baseline, and seems frightened of being in hospital. Not really cooperating with taking clothes off for skin survey. Downgrade to medicine floor.       Vital Signs Last 24 Hrs  T(C): 36.1 (12 Feb 2019 12:00), Max: 38.8 (11 Feb 2019 20:00)  T(F): 97 (12 Feb 2019 12:00), Max: 101.9 (11 Feb 2019 20:00)  HR: 112 (12 Feb 2019 12:00) (104 - 123)  BP: 107/70 (12 Feb 2019 12:00) (87/53 - 107/70)  BP(mean): 84 (12 Feb 2019 08:00) (72 - 84)  RR: 18 (12 Feb 2019 12:00) (18 - 26)  SpO2: 97% (12 Feb 2019 12:00) (97% - 100%)    I&O's Summary    11 Feb 2019 07:01  -  12 Feb 2019 07:00  --------------------------------------------------------  IN: 920 mL / OUT: 730 mL / NET: 190 mL    12 Feb 2019 07:01  -  12 Feb 2019 12:47  --------------------------------------------------------  IN: 650 mL / OUT: 500 mL / NET: 150 mL        Physical Exam:   [SEE progress note]    LABS:                11.8   10.94 )-----------( 112      ( 12 Feb 2019 04:35 )             32.2       02-12    138  |  97<L>  |  17  ----------------------------<  119<H>  3.6   |  24  |  1.0    Ca    8.2<L>      12 Feb 2019 04:35  Mg     1.4     02-12    TPro  6.2  /  Alb  3.6  /  TBili  0.9  /  DBili  x   /  AST  37  /  ALT  12  /  AlkPhos  63  02-11        ASSESSMENT & PLAN:     #Sepsis likely 2/2 pharyngitis and LLE cellulitis  - c/w vancomycin and meropenem  - ID consult pending  - burn consult pending   - c/w LR @ 100 for hydration, tolerating PO diet  - f/u BCx        FOR FOLLOW UP:  [ ] follow up ID consult, Blood cultures  [ ] follow up burn consult  [ ] monitor PO intake / swallowing / breathing, blood pressure    Ana Paula Guerrero MD PGY1 CCU Transfer Note    Transfer from: CCU    Transfer to: ( x ) Medicine    (  ) Telemetry    (  ) RCU                               (  ) Palliative    (  ) Stroke Unit    (  ) MICU    (  ) __________________    Accepting Physician: Med floor    Signout given to: Claudia ([id number])    HPI / CCU COURSE:  37 yo M with intellectual disability brought in by mother from home for poor PO intake and fever. Patient seen in ED 2/10 for vomiting, dehydration. In H&P states strep pharyngitis but unable to find any results for strep+ swab. At that time mother stated she had other children to take care of and her own health to monitor, so discharged AMA. Patient noted then to have leukocytosis and lactic acid 2.1 but still d/c'd home. [facility] and was told to come to ED given low BP and tachy. In ED pt was febrile to 101.9 and tachy to 123 with BP 80s/50s. Got x4L IVF and unasyn, started on cefepime and bhupendra. Mother continued to deny any chills, but does have sore throat, intermittent cough, decreased PO intake, and some vomiting. Also noted to have a L leg skin rash since Oct that failed local cream treatment 2/2 pt persistently scratching.     Admitted overnight to CCU for monitoring of breathing and low BP. Never required intubation or pressors, tolerating PO w/o difficulty. Mother went home and was not amenable to phone conversation overnight as per resident and RN. Patient is AAOx2 at baseline, and seems frightened of being in hospital. Not really cooperating with taking clothes off for skin survey. Downgrade to medicine floor.       Vital Signs Last 24 Hrs  T(C): 36.1 (12 Feb 2019 12:00), Max: 38.8 (11 Feb 2019 20:00)  T(F): 97 (12 Feb 2019 12:00), Max: 101.9 (11 Feb 2019 20:00)  HR: 112 (12 Feb 2019 12:00) (104 - 123)  BP: 107/70 (12 Feb 2019 12:00) (87/53 - 107/70)  BP(mean): 84 (12 Feb 2019 08:00) (72 - 84)  RR: 18 (12 Feb 2019 12:00) (18 - 26)  SpO2: 97% (12 Feb 2019 12:00) (97% - 100%)    I&O's Summary    11 Feb 2019 07:01  -  12 Feb 2019 07:00  --------------------------------------------------------  IN: 920 mL / OUT: 730 mL / NET: 190 mL    12 Feb 2019 07:01  -  12 Feb 2019 12:47  --------------------------------------------------------  IN: 650 mL / OUT: 500 mL / NET: 150 mL        Physical Exam:   [SEE progress note]    LABS:                11.8   10.94 )-----------( 112      ( 12 Feb 2019 04:35 )             32.2       02-12    138  |  97<L>  |  17  ----------------------------<  119<H>  3.6   |  24  |  1.0    Ca    8.2<L>      12 Feb 2019 04:35  Mg     1.4     02-12    TPro  6.2  /  Alb  3.6  /  TBili  0.9  /  DBili  x   /  AST  37  /  ALT  12  /  AlkPhos  63  02-11        ASSESSMENT & PLAN:     #Sepsis likely 2/2 pharyngitis and LLE cellulitis  - c/w vancomycin and meropenem  - ID consult pending  - burn consult pending   - c/w LR @ 100 for hydration, tolerating PO diet  - f/u BCx        FOR FOLLOW UP:  [ ] follow up ID consult, Blood cultures  [ ] follow up burn consult  [ ] monitor PO intake / swallowing / breathing, blood pressure    Ana Paula Guerrero MD PGY1 CCU Transfer Note    Transfer from: CCU    Transfer to: ( x ) Medicine    (  ) Telemetry    (  ) RCU                               (  ) Palliative    (  ) Stroke Unit    (  ) MICU    (  ) __________________    Accepting Physician: Med floor    Signout given to: Claudia (3A-3A)    HPI / CCU COURSE:  39 yo M with intellectual disability brought in by mother from home for poor PO intake and fever. Patient seen in ED 2/10 for vomiting, dehydration. In H&P states strep pharyngitis but unable to find any results for strep+ swab. At that time mother stated she had other children to take care of and her own health to monitor, so discharged AMA. Patient noted then to have leukocytosis and lactic acid 2.1 but still d/c'd home. [facility] [place] [facility] and was told to come to ED given low BP and tachy. In ED pt was febrile to 101.9 and tachy to 123 with BP 80s/50s. Got x4L IVF and unasyn, started on cefepime and bhupendra. Mother continued to deny any chills, but does have sore throat, intermittent cough, decreased PO intake, and some vomiting. Also noted to have a L leg skin rash since Oct that failed local cream treatment 2/2 pt persistently scratching.     Admitted overnight to CCU for monitoring of breathing and low BP. Never required intubation or pressors, tolerating PO w/o difficulty. Mother went home and was not amenable to phone conversation overnight as per resident and RN. Patient is AAOx2 at baseline, and seems frightened of being in hospital. Not really cooperating with taking clothes off for skin survey. Downgrade to medicine floor.       Vital Signs Last 24 Hrs  T(C): 36.1 (12 Feb 2019 12:00), Max: 38.8 (11 Feb 2019 20:00)  T(F): 97 (12 Feb 2019 12:00), Max: 101.9 (11 Feb 2019 20:00)  HR: 112 (12 Feb 2019 12:00) (104 - 123)  BP: 107/70 (12 Feb 2019 12:00) (87/53 - 107/70)  BP(mean): 84 (12 Feb 2019 08:00) (72 - 84)  RR: 18 (12 Feb 2019 12:00) (18 - 26)  SpO2: 97% (12 Feb 2019 12:00) (97% - 100%)    I&O's Summary    11 Feb 2019 07:01  -  12 Feb 2019 07:00  --------------------------------------------------------  IN: 920 mL / OUT: 730 mL / NET: 190 mL    12 Feb 2019 07:01  -  12 Feb 2019 12:47  --------------------------------------------------------  IN: 650 mL / OUT: 500 mL / NET: 150 mL        Physical Exam:   [SEE progress note]    LABS:                11.8   10.94 )-----------( 112      ( 12 Feb 2019 04:35 )             32.2       02-12    138  |  97<L>  |  17  ----------------------------<  119<H>  3.6   |  24  |  1.0    Ca    8.2<L>      12 Feb 2019 04:35  Mg     1.4     02-12    TPro  6.2  /  Alb  3.6  /  TBili  0.9  /  DBili  x   /  AST  37  /  ALT  12  /  AlkPhos  63  02-11        ASSESSMENT & PLAN:     #Sepsis likely 2/2 pharyngitis and LLE cellulitis  - c/w vancomycin and meropenem  - ID consult pending  - burn consult pending   - f/u LE duplex  - c/w LR @ 100 for hydration, tolerating PO diet  - f/u BCx        FOR FOLLOW UP:  [ ] follow up ID consult, Blood cultures  [ ] follow up burn consult  [ ] f/u duplex LE  [ ] monitor PO intake / swallowing / breathing, blood pressure    Ana Paula Guerrero MD PGY1

## 2019-02-12 NOTE — PATIENT PROFILE ADULT - NSPROMUTINFOINDIVIDFT_GEN_A_NUR
unable to assess, patient has intellectual disability and is poor historian . Patient's mother Yenifer called, no information given to deliver individualized care.

## 2019-02-12 NOTE — BRIEF OPERATIVE NOTE - PROCEDURE
<<-----Click on this checkbox to enter Procedure Debridement  02/12/2019  sharp excisional debridement left lower leg 10x5cm  Active  [id number]

## 2019-02-12 NOTE — MEDICAL STUDENT PROGRESS NOTE(EDUCATION) - NS MD HP STUD ASPLAN PLAN FT
#Severe sepsis 2/2 LLE cellulitis/ ? URI  - Tmax in .9, BP 80s/50s, given 4 L IVF and Unasyn  - c/w vancomycin and meropenem  - c/w nystatin suspension  - c/w decadron  - f/u ID consult  - f/u burn consult  - f/u panCx  - f/u LE Doppler  - continue to monitor VS    #JACKSON 2/2 likely dehydration  - Cr 2.2 on admission, last 1.4, baseline 0.8  - eGFR 37 on admission, last 95  - 4 L IVF given in ED, currently on LR 100cc/hr  - monitor I+Os  - serial BMPs  - f/u electrolytes, correct as needed    #Vomiting  - c/w Zofran PRN  - full liquid diet    GI PPx  DVT PPx  Diet: full liquid diet  Disposition: downgrade to medical floor  Full code #Severe sepsis 2/2 LLE cellulitis/ ? URI  - Tmax in .9, BP 80s/50s, given 4 L IVF and Unasyn  - CXR 02/11: No radiographic evidence of acute cardiopulmonary disease.  - c/w vancomycin and meropenem  - c/w nystatin suspension  - c/w decadron  - f/u ID consult  - f/u burn consult  - f/u panCx  - f/u LE Doppler  - continue to monitor VS    #JACKSON 2/2 likely dehydration  - Cr 2.2 on admission, last 1.4, baseline 0.8  - eGFR 37 on admission, last 95  - 4 L IVF given in ED, currently on LR 100cc/hr  - monitor I+Os  - serial BMPs  - f/u electrolytes, correct as needed    #Vomiting  - c/w Zofran PRN  - full liquid diet    GI PPx  DVT PPx  Diet: full liquid diet  Disposition: downgrade to medical floor  Full code #Severe sepsis 2/2 LLE cellulitis/ pharyngitis (likely strep, but not tested)  - Tmax in .9, BP 80s/50s, given 4 L IVF and Unasyn  - CXR 02/11: No radiographic evidence of acute cardiopulmonary disease.  - c/w vancomycin and meropenem  - c/w nystatin suspension  - c/w decadron  - f/u ID consult  - f/u burn consult  - f/u panCx  - f/u LE Doppler  - continue to monitor VS    #JACKSON 2/2 likely dehydration, resolved  - Cr 2.2 on admission, last 1, baseline 0.8  - currently on LR 100cc/hr  - f/u electrolytes, correct as needed    #Vomiting, resolved  - c/w Zofran PRN    GI PPx  DVT PPx  Diet: full liquid diet, adv as tolerated  Disposition: downgrade to medical floor  Full code

## 2019-02-12 NOTE — CONSULT NOTE ADULT - SUBJECTIVE AND OBJECTIVE BOX
asked to eval and treat partial thickness wound and blister anterior left lower leg    PE: anterior left lower leg: 10x5cm blister    PROC: sharp excisional debridement left lower leg to and including dermis--> culture sent

## 2019-02-12 NOTE — MEDICAL STUDENT PROGRESS NOTE(EDUCATION) - NS MD HP STUD ASPLAN ASSES FT
39 yo M with intellectual disability presented to ED with hypotension and fever after signing out AMA 1 day prior for vomiting and dehydration with diagnosis of strep pharyngitis ? Patient seen in urgent care prior to admission and sent to ED for tachycardia and hypotension. 37 yo M with intellectual disability presented to ED with hypotension and fever after signing out AMA 1 day prior for vomiting and dehydration with diagnosis of pharyngitis Patient seen in urgent care prior to admission and sent to ED for tachycardia and hypotension.

## 2019-02-12 NOTE — PATIENT PROFILE ADULT - NSPROMUTANXFEARADDRESSFT_GEN_A_NUR
unable to assess, patient has intellectual disability and is poor historian. Patient reassured and all comfort measures rendered.

## 2019-02-12 NOTE — CONSULT NOTE ADULT - SUBJECTIVE AND OBJECTIVE BOX
Patient is a 38y old  Male who presents with a chief complaint of hypotension (2019 23:10)      HPI:  39 yo M with intellectual disability brought in by family from home for hypotension and fever. Pt was seen 1 day prior in the ED for vomiting and dehydration went AMA. he was diagnosed with strep pharyngitis ? pt was seen earlier today in  and was advised to come to ED given his low BP and tachycardia.  In Ed. tmax 101.9,  and ADALBERTO 80s/50s, given 4L IVFs and unasyn .Mother over phone deny any chills, complaints of sore throat, + intermittent cough, + decreased PO intake, + vomiting  + L leg skin rash since Oct that failed local creams 2/2 persistent scratching by pt (2019 23:10), no pressors, CCU      PAST MEDICAL & SURGICAL HISTORY:  Vomiting, intractability of vomiting not specified, presence of nausea not specified, unspecified vomiting type  No significant past surgical history      SOCIAL HX:   Smoking -    FAMILY HISTORY:  No pertinent family history in first degree relatives      REVIEW OF SYSTEMS SEE HPI          Allergies    No Known Allergies    Intolerances        acetaminophen   Tablet .. 650 milliGRAM(s) Oral every 6 hours PRN  dexamethasone  Injectable 4 milliGRAM(s) IV Push every 8 hours  heparin  Injectable 5000 Unit(s) SubCutaneous every 12 hours  lactated ringers. 1000 milliLiter(s) IV Continuous <Continuous>  meropenem  IVPB 1000 milliGRAM(s) IV Intermittent every 12 hours  nystatin    Suspension 280202 Unit(s) Oral four times a day  ondansetron Injectable 4 milliGRAM(s) IV Push every 6 hours PRN  pantoprazole    Tablet 40 milliGRAM(s) Oral before breakfast  vancomycin  IVPB 750 milliGRAM(s) IV Intermittent every 12 hours  : Home Meds:      PHYSICAL EXAM    ICU Vital Signs Last 24 Hrs  T(C): 36.7 (2019 08:00), Max: 38.8 (2019 20:00)  T(F): 98 (2019 08:00), Max: 101.9 (2019 20:00)  HR: 104 (2019 08:00) (104 - 123)  BP: 105/66 (2019 08:00) (87/53 - 105/66)  BP(mean): 84 (2019 08:00) (72 - 84)  RR: 21 (2019 08:00) (18 - 26)  SpO2: 97% (2019 08:00) (97% - 100%)      General:  HEENT: AWAKE , no pus seen          Lymph Nodes: No cervical LN   Lungs: Bilateral BS,. dec bs both bases  Cardiovascular: Regular  Abdomen: Soft, Positive BS  Extremities: No clubbing/ LLE redness/ swollen/ warm  Skin: Warm  Neurological: Non focal       19 @ 07:01  -  19 @ 07:00  --------------------------------------------------------  IN:    IV PiggyBack: 300 mL    lactated ringers.: 600 mL    Oral Fluid: 20 mL  Total IN: 920 mL    OUT:    Voided: 730 mL  Total OUT: 730 mL    Total NET: 190 mL      19 @ 07:01  -  19 @ 08:40  --------------------------------------------------------  IN:    IV PiggyBack: 50 mL    lactated ringers.: 100 mL    Oral Fluid: 200 mL  Total IN: 350 mL    OUT:  Total OUT: 0 mL    Total NET: 350 mL          LABS:                          11.8   10.94 )-----------( 112      ( 2019 04:35 )             32.2                                               02-12    138  |  97<L>  |  17  ----------------------------<  119<H>  3.6   |  24  |  1.0    Ca    8.2<L>      2019 04:35  Mg     1.4         TPro  6.2  /  Alb  3.6  /  TBili  0.9  /  DBili  x   /  AST  37  /  ALT  12  /  AlkPhos  63  02                                             Urinalysis Basic - ( 2019 01:00 )    Color: Yellow / Appearance: Clear / S.010 / pH: x  Gluc: x / Ketone: Trace  / Bili: Negative / Urobili: 1.0 mg/dL   Blood: x / Protein: Trace mg/dL / Nitrite: Negative   Leuk Esterase: Negative / RBC: 6-10 /HPF / WBC x   Sq Epi: x / Non Sq Epi: x / Bacteria: x                                                  LIVER FUNCTIONS - ( 2019 18:09 )  Alb: 3.6 g/dL / Pro: 6.2 g/dL / ALK PHOS: 63 U/L / ALT: 12 U/L / AST: 37 U/L / GGT: x                                                                                                                                       X-Rays   REVIEWED    MEDICATIONS  (STANDING):  dexamethasone  Injectable 4 milliGRAM(s) IV Push every 8 hours  heparin  Injectable 5000 Unit(s) SubCutaneous every 12 hours  lactated ringers. 1000 milliLiter(s) (100 mL/Hr) IV Continuous <Continuous>  meropenem  IVPB 1000 milliGRAM(s) IV Intermittent every 12 hours  nystatin    Suspension 011801 Unit(s) Oral four times a day  pantoprazole    Tablet 40 milliGRAM(s) Oral before breakfast  vancomycin  IVPB 750 milliGRAM(s) IV Intermittent every 12 hours    MEDICATIONS  (PRN):  acetaminophen   Tablet .. 650 milliGRAM(s) Oral every 6 hours PRN Temp greater or equal to 38.5C (101.3F)  ondansetron Injectable 4 milliGRAM(s) IV Push every 6 hours PRN Nausea

## 2019-02-12 NOTE — PROGRESS NOTE ADULT - ASSESSMENT
39 yo M with intellectual disability p/w fever, sore throat, vomiting and decreased po intake    #Sepsis likely 2/2 pharyngitis and LLE cellulitis  - Responded to 4L fluids, w/o need for pressors (start levo only if MAP <65)  - c/w vancomycin and meropenem  - ID consult pending  - burn consult pending   - c/w LR @ 100 for hydration, tolerating PO diet  - f/u BCx  - c/w nystatin oral solution  - c/w decadron for uvular edema    #JACKSON likely 2/2 dehydration, resolved  - on admit Cr 2.2, baseline 0.8  - resolved, Cr 1    #Vomiting, resolved  - zofran prn  - Full liquid diet, adv as tolerated    #Intellectual disability  - frequent reorientation  - avoid restraints, previously needed 1:1 in past admits    #MISC  - DVT ppx: heparin  - GI ppx: PPI  - full code  - from home  - DISPO: downgrade to [facility] floor 37 yo M with intellectual disability p/w fever, sore throat, vomiting and decreased po intake    #Sepsis likely 2/2 pharyngitis and LLE cellulitis  - Responded to 4L fluids, w/o need for pressors (start levo only if MAP <65)  - c/w vancomycin and meropenem  - ID consult pending  - burn consult pending   - f/u duplex LE  - c/w LR @ 100 for hydration, tolerating PO diet  - f/u BCx  - c/w nystatin oral solution  - c/w decadron for uvular edema    #JACKSON likely 2/2 dehydration, resolved  - on admit Cr 2.2, baseline 0.8  - resolved, Cr 1    #Vomiting, resolved  - zofran prn  - Full liquid diet, adv as tolerated    #Intellectual disability  - frequent reorientation  - avoid restraints, previously needed 1:1 in past admits    #MISC  - DVT ppx: heparin  - GI ppx: PPI  - full code  - from home  - DISPO: downgrade to [facility] floor

## 2019-02-13 LAB
ANION GAP SERPL CALC-SCNC: 16 MMOL/L — HIGH (ref 7–14)
BUN SERPL-MCNC: 14 MG/DL — SIGNIFICANT CHANGE UP (ref 10–20)
CALCIUM SERPL-MCNC: 8.4 MG/DL — LOW (ref 8.5–10.1)
CHLORIDE SERPL-SCNC: 93 MMOL/L — LOW (ref 98–110)
CO2 SERPL-SCNC: 26 MMOL/L — SIGNIFICANT CHANGE UP (ref 17–32)
CREAT SERPL-MCNC: 0.7 MG/DL — SIGNIFICANT CHANGE UP (ref 0.7–1.5)
CULTURE RESULTS: NO GROWTH — SIGNIFICANT CHANGE UP
GLUCOSE SERPL-MCNC: 173 MG/DL — HIGH (ref 70–99)
HCT VFR BLD CALC: 34.1 % — LOW (ref 42–52)
HGB BLD-MCNC: 12.5 G/DL — LOW (ref 14–18)
LACTATE SERPL-SCNC: 1.9 MMOL/L — SIGNIFICANT CHANGE UP (ref 0.5–2.2)
LACTATE SERPL-SCNC: 2.5 MMOL/L — HIGH (ref 0.5–2.2)
MAGNESIUM SERPL-MCNC: 1.9 MG/DL — SIGNIFICANT CHANGE UP (ref 1.8–2.4)
MCHC RBC-ENTMCNC: 30.9 PG — SIGNIFICANT CHANGE UP (ref 27–31)
MCHC RBC-ENTMCNC: 36.7 G/DL — SIGNIFICANT CHANGE UP (ref 32–37)
MCV RBC AUTO: 84.4 FL — SIGNIFICANT CHANGE UP (ref 80–94)
NRBC # BLD: 0 /100 WBCS — SIGNIFICANT CHANGE UP (ref 0–0)
PLATELET # BLD AUTO: 132 K/UL — SIGNIFICANT CHANGE UP (ref 130–400)
POTASSIUM SERPL-MCNC: 3.7 MMOL/L — SIGNIFICANT CHANGE UP (ref 3.5–5)
POTASSIUM SERPL-SCNC: 3.7 MMOL/L — SIGNIFICANT CHANGE UP (ref 3.5–5)
RBC # BLD: 4.04 M/UL — LOW (ref 4.7–6.1)
RBC # FLD: 12.7 % — SIGNIFICANT CHANGE UP (ref 11.5–14.5)
SODIUM SERPL-SCNC: 135 MMOL/L — SIGNIFICANT CHANGE UP (ref 135–146)
SPECIMEN SOURCE: SIGNIFICANT CHANGE UP
WBC # BLD: 19.17 K/UL — HIGH (ref 4.8–10.8)
WBC # FLD AUTO: 19.17 K/UL — HIGH (ref 4.8–10.8)

## 2019-02-13 RX ORDER — CEFAZOLIN SODIUM 1 G
2000 VIAL (EA) INJECTION ONCE
Qty: 0 | Refills: 0 | Status: COMPLETED | OUTPATIENT
Start: 2019-02-13 | End: 2019-02-13

## 2019-02-13 RX ORDER — CEFAZOLIN SODIUM 1 G
VIAL (EA) INJECTION
Qty: 0 | Refills: 0 | Status: DISCONTINUED | OUTPATIENT
Start: 2019-02-13 | End: 2019-02-15

## 2019-02-13 RX ORDER — SODIUM CHLORIDE 9 MG/ML
1000 INJECTION, SOLUTION INTRAVENOUS
Qty: 0 | Refills: 0 | Status: DISCONTINUED | OUTPATIENT
Start: 2019-02-13 | End: 2019-02-14

## 2019-02-13 RX ORDER — CEFAZOLIN SODIUM 1 G
2000 VIAL (EA) INJECTION EVERY 8 HOURS
Qty: 0 | Refills: 0 | Status: DISCONTINUED | OUTPATIENT
Start: 2019-02-13 | End: 2019-02-15

## 2019-02-13 RX ORDER — BACITRACIN ZINC 500 UNIT/G
1 OINTMENT IN PACKET (EA) TOPICAL
Qty: 0 | Refills: 0 | Status: DISCONTINUED | OUTPATIENT
Start: 2019-02-13 | End: 2019-02-18

## 2019-02-13 RX ADMIN — MEROPENEM 100 MILLIGRAM(S): 1 INJECTION INTRAVENOUS at 05:07

## 2019-02-13 RX ADMIN — Medication 100 MILLIGRAM(S): at 21:13

## 2019-02-13 RX ADMIN — Medication 500000 UNIT(S): at 05:07

## 2019-02-13 RX ADMIN — PANTOPRAZOLE SODIUM 40 MILLIGRAM(S): 20 TABLET, DELAYED RELEASE ORAL at 06:20

## 2019-02-13 RX ADMIN — HEPARIN SODIUM 5000 UNIT(S): 5000 INJECTION INTRAVENOUS; SUBCUTANEOUS at 17:15

## 2019-02-13 RX ADMIN — Medication 4 MILLIGRAM(S): at 13:37

## 2019-02-13 RX ADMIN — CHLORHEXIDINE GLUCONATE 1 APPLICATION(S): 213 SOLUTION TOPICAL at 05:07

## 2019-02-13 RX ADMIN — Medication 100 MILLIGRAM(S): at 15:18

## 2019-02-13 RX ADMIN — SODIUM CHLORIDE 100 MILLILITER(S): 9 INJECTION, SOLUTION INTRAVENOUS at 06:20

## 2019-02-13 RX ADMIN — Medication 500000 UNIT(S): at 17:15

## 2019-02-13 RX ADMIN — Medication 4 MILLIGRAM(S): at 05:07

## 2019-02-13 RX ADMIN — Medication 650 MILLIGRAM(S): at 20:45

## 2019-02-13 RX ADMIN — Medication 1 APPLICATION(S): at 17:16

## 2019-02-13 RX ADMIN — Medication 1 APPLICATION(S): at 05:08

## 2019-02-13 RX ADMIN — HEPARIN SODIUM 5000 UNIT(S): 5000 INJECTION INTRAVENOUS; SUBCUTANEOUS at 05:07

## 2019-02-13 RX ADMIN — Medication 250 MILLIGRAM(S): at 05:07

## 2019-02-13 RX ADMIN — SODIUM CHLORIDE 150 MILLILITER(S): 9 INJECTION, SOLUTION INTRAVENOUS at 11:19

## 2019-02-13 RX ADMIN — Medication 500000 UNIT(S): at 11:05

## 2019-02-13 NOTE — PROGRESS NOTE ADULT - ASSESSMENT
IMPRESSION: severe sepsis/ LLE cellulitis/ ? uri/ s/p IVF, no pressors. better      PLAN:    CNS: no CNS depressant    HEENT:  Oral care    PULMONARY:  HOB @ 45 degrees, aspiration precaution    CARDIOVASCULAR: IVF +    GI: GI prophylaxis                                          Feeding PO    RENAL:  F/u  lytes.  Correct as needed. accurate I/O    INFECTIOUS DISEASE: PANCX, ID EVAL, vanco trough    HEMATOLOGICAL:  DVT prophylaxis. BURN EVAL, LE DOPPLER    ENDOCRINE:  Follow up FS.  Insulin protocol if needed.    CODE STATUS: FULL CODE    recall as needed

## 2019-02-13 NOTE — PROGRESS NOTE ADULT - ATTENDING COMMENTS
Patient seen and examined independently of resident. My addendum supersedes resident notation. Case discussed with housestaff, nursing and patient

## 2019-02-13 NOTE — PROGRESS NOTE ADULT - ASSESSMENT
39 yo M with intellectual disability here with Severe sepsis 2/2 strep pharyngitis and LLE purulent cellulitis s/p debridement, hypomagnesemia (improved)    improving now, hemodynamics stable  trend wbc  local wound care to L shin  IV abx per ID recs  f/u deep wound cx, burn f/u  DVT ppx

## 2019-02-13 NOTE — PROGRESS NOTE ADULT - ASSESSMENT
37 yo M with intellectual disability p/w fever, sore throat, vomiting, decreased po intake, hypotension with Severe sepsis on admission with Pharyngitis and LLE Ulcer with periwound cellulitis monitored in the ICU for 24 hours never on pressors or intubated, downgraded to medical floor.     #Sepsis likely 2/2 pharyngitis and LLE cellulitis  - Responded to 4L fluids, w/o need for pressors (start levo only if MAP <65)  -CXR negative, LE doppler negative for DVT  -Leukocytosis  -Lactate 3.2> 2.0>1.1>2.5  -Bx negative x2   - deescalated from vancomycin and meropenem  - s/p Debridement w/burn 10x5cm blister to the dermis  - c/w LR @ 150 for lactic acidemia, tolerating PO diet  - c/w nystatin oral solution  - c/w IV Ancef 2G Q8hrs  - Per ID switch to PO doxy 100mg Q12 hrs for 8 days  -Soap and water qd with bacitracin ointment and large bandaid  -f/u Wound cx      #JACKSON likely 2/2 dehydration, resolved  - on admit Cr 2.2, baseline 0.8  - resolved, Cr 0.7    #Vomiting, resolved  - zofran 4mg Q6 prn  -Soft Diet adv as tolerated    #Intellectual disability  - frequent reorientation  - avoid restraints, previously needed 1:1 in past admits    #MISC  # DVT ppx: heparin  #GI ppx: PPI  # Diet: Soft- advance as tolerated  #full code  #Dispo: from home with mother  - 39 yo M with intellectual disability p/w fever, sore throat, vomiting, decreased po intake, hypotension with Severe sepsis on admission with Pharyngitis and LLE Ulcer with periwound cellulitis monitored in the ICU for 24 hours never on pressors or intubated, downgraded to medical floor.     #Sepsis likely 2/2 pharyngitis and LLE cellulitis  - Responded to 4L fluids, w/o need for pressors (start levo only if MAP <65)  -CXR negative, LE doppler negative for DVT  -Leukocytosis  -Lactate 3.2> 2.0>1.1>2.5  -Bx negative x2   - deescalated from vancomycin and meropenem  - s/p Debridement w/burn 10x5cm blister to the dermis  - c/w LR @ 150 for lactic acidemia, tolerating PO diet  - c/w nystatin oral solution  - c/w IV Ancef 2G Q8hrs  - Per ID switch to PO doxy 100mg Q12 hrs for 8 days  -Soap and water qd with bacitracin ointment and large bandaid  -f/u Wound cx      #JACKSON likely 2/2 dehydration, resolved  - on admit Cr 2.2, baseline 0.8  - resolved, Cr 0.7    #Hypomagnesemia  replenished  resolved    #Vomiting, resolved  - zofran 4mg Q6 prn  -Soft Diet adv as tolerated    #Intellectual disability  - frequent reorientation  - avoid restraints, previously needed 1:1 in past admits    #MISC  # DVT ppx: heparin  #GI ppx: PPI  # Diet: Soft- advance as tolerated  #full code  #Dispo: from home with mother  - 39 yo M with intellectual disability p/w fever, sore throat, vomiting, decreased po intake, hypotension with Severe sepsis on admission with Pharyngitis and LLE Ulcer with periwound cellulitis monitored in the ICU for 24 hours never on pressors or intubated, downgraded to medical floor.     #Sepsis likely 2/2 pharyngitis and LLE cellulitis  - Responded to 4L fluids, w/o need for pressors (start levo only if MAP <65)  -CXR negative, LE doppler negative for DVT  -Leukocytosis  -Lactate 3.2> 2.0>1.1>2.5  -Bx negative x2   - deescalated from vancomycin and meropenem  - s/p Debridement w/burn 10x5cm blister to the dermis  - c/w LR @ 150 for lactic acidemia, tolerating PO diet  - c/w nystatin oral solution  - c/w IV Ancef 2G Q8hrs  - Per ID switch to PO doxy 100mg Q12 hrs for 8 days  -Soap and water qd with bacitracin ointment and large bandaid  -f/u Wound cx, lactate afternoon and AM      #JACKSON likely 2/2 dehydration, resolved  - on admit Cr 2.2, baseline 0.8  - resolved, Cr 0.7    #Hypomagnesemia  replenished  resolved    #Vomiting, resolved  - zofran 4mg Q6 prn  -Soft Diet adv as tolerated    #Intellectual disability  - frequent reorientation  - avoid restraints, previously needed 1:1 in past admits    #MISC  # DVT ppx: heparin  #GI ppx: PPI  # Diet: Soft- advance as tolerated  #full code  #Dispo: from home with mother  -

## 2019-02-13 NOTE — CONSULT NOTE ADULT - SUBJECTIVE AND OBJECTIVE BOX
HANSACRISTINA  38y, Male  Allergy: No Known Allergies      HPI:  37 yo M with intellectual disability brought in by family from home for hypotension and fever.  Pt was seen 1 day prior in the ED for vomiting and dehydration. he was diagnosed with strep pharyngitis.  pt was seen earlier today in  and was advised to come to ED given his low BP and tachycardia.  In Ed. tmax 101.9,  and ADALBERTO 80s/50s, given 4L IVFs and unasyn  Mother over phone deny any chills, complaints of sore throat, + intermittent cough, + decreased PO intake, + vomiting  + L leg skin rash since Oct that failed local creams 2/2 persistent scratching by pt (2019 23:10)    FAMILY HISTORY:  No pertinent family history in first degree relatives    PAST MEDICAL & SURGICAL HISTORY:  Vomiting, intractability of vomiting not specified, presence of nausea not specified, unspecified vomiting type  No significant past surgical history        VITALS:  T(F): 98, Max: 99.7 (19 @ 14:00)  HR: 98  BP: 95/56  RR: 18Vital Signs Last 24 Hrs  T(C): 36.7 (2019 05:28), Max: 37.6 (2019 14:00)  T(F): 98 (2019 05:28), Max: 99.7 (2019 14:00)  HR: 98 (2019 05:28) (97 - 110)  BP: 95/56 (2019 05:28) (88/52 - 119/75)  BP(mean): 88 (2019 14:00) (88 - 88)  RR: 18 (2019 05:28) (18 - 21)  SpO2: 99% (2019 18:07) (96% - 99%)    TESTS & MEASUREMENTS:                        12.5   19.17 )-----------( 132      ( 2019 08:31 )             34.1     02-13    135  |  93<L>  |  14  ----------------------------<  173<H>  3.7   |  26  |  0.7    Ca    8.4<L>      2019 08:31  Mg     1.9         TPro  6.2  /  Alb  3.6  /  TBili  0.9  /  DBili  x   /  AST  37  /  ALT  12  /  AlkPhos  63      LIVER FUNCTIONS - ( 2019 18:09 )  Alb: 3.6 g/dL / Pro: 6.2 g/dL / ALK PHOS: 63 U/L / ALT: 12 U/L / AST: 37 U/L / GGT: x             Culture - Urine (collected 19 @ 01:00)  Source: .Urine Clean Catch (Midstream)  Final Report (19 @ 11:54):    No growth    Culture - Blood (collected 19 @ 18:35)  Source: .Blood Blood  Preliminary Report (19 @ 02:09):    No growth to date.    Culture - Blood (collected 19 @ 18:35)  Source: .Blood Blood  Preliminary Report (19 @ 02:09):    No growth to date.      Urinalysis Basic - ( 2019 01:00 )    Color: Yellow / Appearance: Clear / S.010 / pH: x  Gluc: x / Ketone: Trace  / Bili: Negative / Urobili: 1.0 mg/dL   Blood: x / Protein: Trace mg/dL / Nitrite: Negative   Leuk Esterase: Negative / RBC: 6-10 /HPF / WBC x   Sq Epi: x / Non Sq Epi: x / Bacteria: x          RADIOLOGY & ADDITIONAL TESTS:    ANTIBIOTICS:  meropenem  IVPB 1000 milliGRAM(s) IV Intermittent every 12 hours  nystatin    Suspension 524251 Unit(s) Oral four times a day  vancomycin  IVPB 750 milliGRAM(s) IV Intermittent every 12 hours

## 2019-02-13 NOTE — CONSULT NOTE ADULT - ASSESSMENT
IMPRESSION: severe sepsis/ LLE cellulitis/ ? uri/ s/p IVF, no pressors. better      PLAN:    CNS: no CNS depressant    HEENT:  Oral care    PULMONARY:  HOB @ 45 degrees, aspiration precaution    CARDIOVASCULAR: IVF ++    GI: GI prophylaxis                                          Feeding PO    RENAL:  F/u  lytes.  Correct as needed. accurate I/O    INFECTIOUS DISEASE: PANCX, ID EVAL    HEMATOLOGICAL:  DVT prophylaxis. BURN EVAL, LE DOPPLER    ENDOCRINE:  Follow up FS.  Insulin protocol if needed.    CODE STATUS: FULL CODE    DISPOSITION: downgrade to floor
partial thickness wound left lower leg ? etiology--> debrided and culture sent    rec: soap and water qd, bacitracin ointment and bandaide dresssing change bid, no further debridement needed
IMPRESSION:  Faint cellulitis LLE at site of debridement.  No abscess/ OM  No ongoing tonsillitis ( Clinically no enlarged tonsils and no adenopathy )  BCx NTD    RECOMMENDATIONS:  Ancef 2 gm iv q8h  Po Doxycycline 100 mg q12h on 2/14 for 8 days  D/c other ABx

## 2019-02-14 LAB
ANION GAP SERPL CALC-SCNC: 13 MMOL/L — SIGNIFICANT CHANGE UP (ref 7–14)
BUN SERPL-MCNC: 15 MG/DL — SIGNIFICANT CHANGE UP (ref 10–20)
CALCIUM SERPL-MCNC: 8.1 MG/DL — LOW (ref 8.5–10.1)
CHLORIDE SERPL-SCNC: 101 MMOL/L — SIGNIFICANT CHANGE UP (ref 98–110)
CO2 SERPL-SCNC: 27 MMOL/L — SIGNIFICANT CHANGE UP (ref 17–32)
CREAT SERPL-MCNC: 0.7 MG/DL — SIGNIFICANT CHANGE UP (ref 0.7–1.5)
GLUCOSE SERPL-MCNC: 120 MG/DL — HIGH (ref 70–99)
HCT VFR BLD CALC: 30.4 % — LOW (ref 42–52)
HGB BLD-MCNC: 11.1 G/DL — LOW (ref 14–18)
LACTATE SERPL-SCNC: 1.8 MMOL/L — SIGNIFICANT CHANGE UP (ref 0.5–2.2)
MAGNESIUM SERPL-MCNC: 1.7 MG/DL — LOW (ref 1.8–2.4)
MCHC RBC-ENTMCNC: 31 PG — SIGNIFICANT CHANGE UP (ref 27–31)
MCHC RBC-ENTMCNC: 36.5 G/DL — SIGNIFICANT CHANGE UP (ref 32–37)
MCV RBC AUTO: 84.9 FL — SIGNIFICANT CHANGE UP (ref 80–94)
NRBC # BLD: 0 /100 WBCS — SIGNIFICANT CHANGE UP (ref 0–0)
PLATELET # BLD AUTO: 118 K/UL — LOW (ref 130–400)
POTASSIUM SERPL-MCNC: 3.9 MMOL/L — SIGNIFICANT CHANGE UP (ref 3.5–5)
POTASSIUM SERPL-SCNC: 3.9 MMOL/L — SIGNIFICANT CHANGE UP (ref 3.5–5)
RBC # BLD: 3.58 M/UL — LOW (ref 4.7–6.1)
RBC # FLD: 12.7 % — SIGNIFICANT CHANGE UP (ref 11.5–14.5)
SODIUM SERPL-SCNC: 141 MMOL/L — SIGNIFICANT CHANGE UP (ref 135–146)
WBC # BLD: 17.23 K/UL — HIGH (ref 4.8–10.8)
WBC # FLD AUTO: 17.23 K/UL — HIGH (ref 4.8–10.8)

## 2019-02-14 RX ORDER — SODIUM CHLORIDE 9 MG/ML
1000 INJECTION INTRAMUSCULAR; INTRAVENOUS; SUBCUTANEOUS
Qty: 0 | Refills: 0 | Status: DISCONTINUED | OUTPATIENT
Start: 2019-02-14 | End: 2019-02-16

## 2019-02-14 RX ORDER — MAGNESIUM SULFATE 500 MG/ML
2 VIAL (ML) INJECTION ONCE
Qty: 0 | Refills: 0 | Status: COMPLETED | OUTPATIENT
Start: 2019-02-14 | End: 2019-02-14

## 2019-02-14 RX ADMIN — HEPARIN SODIUM 5000 UNIT(S): 5000 INJECTION INTRAVENOUS; SUBCUTANEOUS at 17:49

## 2019-02-14 RX ADMIN — HEPARIN SODIUM 5000 UNIT(S): 5000 INJECTION INTRAVENOUS; SUBCUTANEOUS at 05:06

## 2019-02-14 RX ADMIN — Medication 1 APPLICATION(S): at 12:04

## 2019-02-14 RX ADMIN — Medication 100 MILLIGRAM(S): at 21:46

## 2019-02-14 RX ADMIN — Medication 1 APPLICATION(S): at 05:06

## 2019-02-14 RX ADMIN — CHLORHEXIDINE GLUCONATE 1 APPLICATION(S): 213 SOLUTION TOPICAL at 05:06

## 2019-02-14 RX ADMIN — SODIUM CHLORIDE 60 MILLILITER(S): 9 INJECTION INTRAMUSCULAR; INTRAVENOUS; SUBCUTANEOUS at 15:39

## 2019-02-14 RX ADMIN — Medication 500000 UNIT(S): at 05:05

## 2019-02-14 RX ADMIN — Medication 500000 UNIT(S): at 12:05

## 2019-02-14 RX ADMIN — Medication 500000 UNIT(S): at 23:32

## 2019-02-14 RX ADMIN — Medication 500000 UNIT(S): at 03:04

## 2019-02-14 RX ADMIN — Medication 12.5 GRAM(S): at 12:05

## 2019-02-14 RX ADMIN — PANTOPRAZOLE SODIUM 40 MILLIGRAM(S): 20 TABLET, DELAYED RELEASE ORAL at 05:06

## 2019-02-14 RX ADMIN — Medication 100 MILLIGRAM(S): at 05:06

## 2019-02-14 RX ADMIN — Medication 500000 UNIT(S): at 17:48

## 2019-02-14 RX ADMIN — Medication 100 MILLIGRAM(S): at 13:53

## 2019-02-14 RX ADMIN — Medication 1 APPLICATION(S): at 13:00

## 2019-02-14 NOTE — PROGRESS NOTE ADULT - ASSESSMENT
37 yo M with intellectual disability p/w fever, sore throat, vomiting, decreased po intake, hypotension with Severe sepsis on admission with Pharyngitis and LLE Ulcer with periwound cellulitis monitored in the ICU for 24 hours never on pressors or intubated, downgraded to medical floor.     #Sepsis likely 2/2 pharyngitis and LLE cellulitis  - Responded to 4L fluids, w/o need for pressors (start levo only if MAP <65)  -CXR negative, LE doppler negative for DVT  -Leukocytosis  -Lactate 3.2> 2.0>1.1>2.5>1.8  -Bx negative x2   - deescalated from vancomycin and meropenem  - s/p Debridement w/burn 10x5cm blister to the dermis  - c/w nystatin oral solution  - c/w IV Ancef 2G Q8hrs  - Per ID switch to PO doxy 100mg Q12 hrs for 8 days  -Soap and water qd with bacitracin ointment and large tegaderm  -f/u Wound cx prelim no growth      #JACKSON likely 2/2 dehydration, resolved  - on admit Cr 2.2, baseline 0.8  - resolved, Cr 0.7    #Hypomagnesemia  repleated  resolved    #Vomiting, resolved  - zofran 4mg Q6 prn  -Soft Diet adv as tolerated    #Intellectual disability  - frequent reorientation  - avoid restraints, previously needed 1:1 in past admits    #MISC  # DVT ppx: heparin  #GI ppx: PPI  # Diet: Soft- advance as tolerated  #full code  #Dispo: from home with mother  -

## 2019-02-15 LAB
ANION GAP SERPL CALC-SCNC: 13 MMOL/L — SIGNIFICANT CHANGE UP (ref 7–14)
BLD GP AB SCN SERPL QL: SIGNIFICANT CHANGE UP
BUN SERPL-MCNC: 10 MG/DL — SIGNIFICANT CHANGE UP (ref 10–20)
CALCIUM SERPL-MCNC: 7.6 MG/DL — LOW (ref 8.5–10.1)
CHLORIDE SERPL-SCNC: 101 MMOL/L — SIGNIFICANT CHANGE UP (ref 98–110)
CO2 SERPL-SCNC: 27 MMOL/L — SIGNIFICANT CHANGE UP (ref 17–32)
CREAT SERPL-MCNC: 0.6 MG/DL — LOW (ref 0.7–1.5)
CULTURE RESULTS: SIGNIFICANT CHANGE UP
GLUCOSE SERPL-MCNC: 81 MG/DL — SIGNIFICANT CHANGE UP (ref 70–99)
HCT VFR BLD CALC: 32.1 % — LOW (ref 42–52)
HGB BLD-MCNC: 11.2 G/DL — LOW (ref 14–18)
MAGNESIUM SERPL-MCNC: 2.2 MG/DL — SIGNIFICANT CHANGE UP (ref 1.8–2.4)
MCHC RBC-ENTMCNC: 30.2 PG — SIGNIFICANT CHANGE UP (ref 27–31)
MCHC RBC-ENTMCNC: 34.9 G/DL — SIGNIFICANT CHANGE UP (ref 32–37)
MCV RBC AUTO: 86.5 FL — SIGNIFICANT CHANGE UP (ref 80–94)
NRBC # BLD: 0 /100 WBCS — SIGNIFICANT CHANGE UP (ref 0–0)
PLATELET # BLD AUTO: 155 K/UL — SIGNIFICANT CHANGE UP (ref 130–400)
POTASSIUM SERPL-MCNC: 3.8 MMOL/L — SIGNIFICANT CHANGE UP (ref 3.5–5)
POTASSIUM SERPL-SCNC: 3.8 MMOL/L — SIGNIFICANT CHANGE UP (ref 3.5–5)
RBC # BLD: 3.71 M/UL — LOW (ref 4.7–6.1)
RBC # FLD: 13.2 % — SIGNIFICANT CHANGE UP (ref 11.5–14.5)
SODIUM SERPL-SCNC: 141 MMOL/L — SIGNIFICANT CHANGE UP (ref 135–146)
SPECIMEN SOURCE: SIGNIFICANT CHANGE UP
TYPE + AB SCN PNL BLD: SIGNIFICANT CHANGE UP
WBC # BLD: 10.82 K/UL — HIGH (ref 4.8–10.8)
WBC # FLD AUTO: 10.82 K/UL — HIGH (ref 4.8–10.8)

## 2019-02-15 RX ORDER — ASCORBIC ACID 60 MG
500 TABLET,CHEWABLE ORAL DAILY
Qty: 0 | Refills: 0 | Status: DISCONTINUED | OUTPATIENT
Start: 2019-02-15 | End: 2019-02-18

## 2019-02-15 RX ORDER — ZINC SULFATE TAB 220 MG (50 MG ZINC EQUIVALENT) 220 (50 ZN) MG
220 TAB ORAL DAILY
Qty: 0 | Refills: 0 | Status: DISCONTINUED | OUTPATIENT
Start: 2019-02-15 | End: 2019-02-18

## 2019-02-15 RX ORDER — VANCOMYCIN HCL 1 G
1000 VIAL (EA) INTRAVENOUS EVERY 12 HOURS
Qty: 0 | Refills: 0 | Status: DISCONTINUED | OUTPATIENT
Start: 2019-02-15 | End: 2019-02-18

## 2019-02-15 RX ADMIN — CHLORHEXIDINE GLUCONATE 1 APPLICATION(S): 213 SOLUTION TOPICAL at 05:19

## 2019-02-15 RX ADMIN — Medication 100 MILLIGRAM(S): at 05:19

## 2019-02-15 RX ADMIN — Medication 1 APPLICATION(S): at 05:19

## 2019-02-15 RX ADMIN — Medication 500000 UNIT(S): at 05:20

## 2019-02-15 RX ADMIN — Medication 500000 UNIT(S): at 17:12

## 2019-02-15 RX ADMIN — PANTOPRAZOLE SODIUM 40 MILLIGRAM(S): 20 TABLET, DELAYED RELEASE ORAL at 05:20

## 2019-02-15 RX ADMIN — Medication 1 APPLICATION(S): at 17:10

## 2019-02-15 RX ADMIN — HEPARIN SODIUM 5000 UNIT(S): 5000 INJECTION INTRAVENOUS; SUBCUTANEOUS at 17:11

## 2019-02-15 RX ADMIN — ZINC SULFATE TAB 220 MG (50 MG ZINC EQUIVALENT) 220 MILLIGRAM(S): 220 (50 ZN) TAB at 11:34

## 2019-02-15 RX ADMIN — HEPARIN SODIUM 5000 UNIT(S): 5000 INJECTION INTRAVENOUS; SUBCUTANEOUS at 05:20

## 2019-02-15 RX ADMIN — Medication 1 TABLET(S): at 11:34

## 2019-02-15 RX ADMIN — Medication 250 MILLIGRAM(S): at 17:12

## 2019-02-15 RX ADMIN — SODIUM CHLORIDE 60 MILLILITER(S): 9 INJECTION INTRAMUSCULAR; INTRAVENOUS; SUBCUTANEOUS at 21:29

## 2019-02-15 RX ADMIN — Medication 500 MILLIGRAM(S): at 11:34

## 2019-02-15 RX ADMIN — Medication 500000 UNIT(S): at 11:34

## 2019-02-15 NOTE — PROGRESS NOTE ADULT - ASSESSMENT
37 yo M with intellectual disability p/w fever, sore throat, vomiting, decreased po intake, hypotension with Severe sepsis on admission with Pharyngitis and LLE Ulcer with periwound cellulitis monitored in the ICU for 24 hours never on pressors or intubated, downgraded to medical floor    #Sepsis likely 2/2 LLE cellulitis  -Sepsis resolved  -CXR negative, LE doppler negative for DVT  -Leukocytosis improving today  -Lactic acidosis resolved  -Bx negative x2   -LLE with swelling and worsening erythema  - s/p Debridement w/burn 10x5cm blister to the dermis  - c/w nystatin oral solution  - c/w Vancomycin 1G Q12hrs  -Soap and water qd with bacitracin ointment and large Tegaderm  -f/u Wound cx prelim no growth, CT LLE read      #JACKSON likely 2/2 dehydration, resolved  - on admit Cr 2.2, baseline 0.8  - resolved, Cr 0.7    #Hypomagnesemia  repleated  resolved    #Vomiting, resolved  - zofran 4mg Q6 prn  -Soft Diet adv as tolerated    #Intellectual disability  - frequent reorientation  - avoid restraints, previously needed 1:1 in past admits    #MISC  # DVT ppx: heparin  #GI ppx: PPI  # Diet: Soft- advance as tolerated  #full code  #Dispo: from home with mother  -

## 2019-02-15 NOTE — PROGRESS NOTE ADULT - ASSESSMENT
IMPRESSION:  Faint cellulitis LLE at site of debridement.  No abscess/ OM clinically  No ongoing tonsillitis ( Clinically no enlarged tonsils and no adenopathy )  BCx NTD  Surgical cultures are negative  Increase in erythema    RECOMMENDATIONS:  CT results pending  Vancomycin 1 gm iv q12h for now  Possibly repeat debridement today  Local Silverdene cream to LLE q12h

## 2019-02-15 NOTE — PROGRESS NOTE ADULT - ASSESSMENT
Cellulitis and partial thickness wound of left leg  Improving. No OR   Continue wound care - bacitracin ointment, Adaptic and dry wrap   Continue elevation and light compression   wbc - normalized   Cont IV abx per ID

## 2019-02-15 NOTE — PROGRESS NOTE ADULT - ASSESSMENT
39 yo M with intellectual disability here with Severe sepsis 2/2 strep pharyngitis and LLE purulent cellulitis s/p debridement, hypomagnesemia (improved)    appreciated burn and ID f/u and recommendations  changing IV abx coverage per recommendation  CT leg demonstrates deep infection but no drainable abscess  no OR debridement planned at this time- will c/w local wound care per updated burn recommendation  c/w dvt ppx, us duplex has been negative

## 2019-02-15 NOTE — SWALLOW BEDSIDE ASSESSMENT ADULT - COMMENTS
dysphagia evaluation conducted bedside. pt received alert, responsive. pt with dysarthria of speech. normal breath patterns, on room air. no c/o pain, discomfort noted. +toleration mild oral dysphagia moderate oral dysphagia

## 2019-02-15 NOTE — SWALLOW BEDSIDE ASSESSMENT ADULT - SWALLOW EVAL: DIAGNOSIS
+toleration of Dysphagia diet I puree consistency, thins, Dysphagia Diet II mechanical soft consistency with ground meat  mild oral dysphagia for Dysphagia Diet III Mechanical soft consistency with cut up meats . moderate oral dysphagia for regular

## 2019-02-16 LAB
ANION GAP SERPL CALC-SCNC: 11 MMOL/L — SIGNIFICANT CHANGE UP (ref 7–14)
BUN SERPL-MCNC: 10 MG/DL — SIGNIFICANT CHANGE UP (ref 10–20)
CALCIUM SERPL-MCNC: 7.6 MG/DL — LOW (ref 8.5–10.1)
CHLORIDE SERPL-SCNC: 102 MMOL/L — SIGNIFICANT CHANGE UP (ref 98–110)
CO2 SERPL-SCNC: 26 MMOL/L — SIGNIFICANT CHANGE UP (ref 17–32)
CREAT SERPL-MCNC: 0.6 MG/DL — LOW (ref 0.7–1.5)
GLUCOSE SERPL-MCNC: 93 MG/DL — SIGNIFICANT CHANGE UP (ref 70–99)
HCT VFR BLD CALC: 31.3 % — LOW (ref 42–52)
HGB BLD-MCNC: 11.2 G/DL — LOW (ref 14–18)
MAGNESIUM SERPL-MCNC: 2 MG/DL — SIGNIFICANT CHANGE UP (ref 1.8–2.4)
MCHC RBC-ENTMCNC: 31 PG — SIGNIFICANT CHANGE UP (ref 27–31)
MCHC RBC-ENTMCNC: 35.8 G/DL — SIGNIFICANT CHANGE UP (ref 32–37)
MCV RBC AUTO: 86.7 FL — SIGNIFICANT CHANGE UP (ref 80–94)
NRBC # BLD: 0 /100 WBCS — SIGNIFICANT CHANGE UP (ref 0–0)
PHOSPHATE SERPL-MCNC: 2.5 MG/DL — SIGNIFICANT CHANGE UP (ref 2.1–4.9)
PLATELET # BLD AUTO: 211 K/UL — SIGNIFICANT CHANGE UP (ref 130–400)
POTASSIUM SERPL-MCNC: 3.7 MMOL/L — SIGNIFICANT CHANGE UP (ref 3.5–5)
POTASSIUM SERPL-SCNC: 3.7 MMOL/L — SIGNIFICANT CHANGE UP (ref 3.5–5)
RBC # BLD: 3.61 M/UL — LOW (ref 4.7–6.1)
RBC # FLD: 13.1 % — SIGNIFICANT CHANGE UP (ref 11.5–14.5)
SODIUM SERPL-SCNC: 139 MMOL/L — SIGNIFICANT CHANGE UP (ref 135–146)
WBC # BLD: 10.41 K/UL — SIGNIFICANT CHANGE UP (ref 4.8–10.8)
WBC # FLD AUTO: 10.41 K/UL — SIGNIFICANT CHANGE UP (ref 4.8–10.8)

## 2019-02-16 RX ORDER — SODIUM CHLORIDE 9 MG/ML
1000 INJECTION, SOLUTION INTRAVENOUS ONCE
Qty: 0 | Refills: 0 | Status: COMPLETED | OUTPATIENT
Start: 2019-02-16 | End: 2019-02-16

## 2019-02-16 RX ADMIN — Medication 250 MILLIGRAM(S): at 05:19

## 2019-02-16 RX ADMIN — Medication 1 APPLICATION(S): at 17:02

## 2019-02-16 RX ADMIN — Medication 500000 UNIT(S): at 17:04

## 2019-02-16 RX ADMIN — ZINC SULFATE TAB 220 MG (50 MG ZINC EQUIVALENT) 220 MILLIGRAM(S): 220 (50 ZN) TAB at 11:23

## 2019-02-16 RX ADMIN — Medication 500000 UNIT(S): at 05:18

## 2019-02-16 RX ADMIN — HEPARIN SODIUM 5000 UNIT(S): 5000 INJECTION INTRAVENOUS; SUBCUTANEOUS at 17:04

## 2019-02-16 RX ADMIN — HEPARIN SODIUM 5000 UNIT(S): 5000 INJECTION INTRAVENOUS; SUBCUTANEOUS at 05:18

## 2019-02-16 RX ADMIN — Medication 1 TABLET(S): at 11:24

## 2019-02-16 RX ADMIN — PANTOPRAZOLE SODIUM 40 MILLIGRAM(S): 20 TABLET, DELAYED RELEASE ORAL at 05:19

## 2019-02-16 RX ADMIN — Medication 500 MILLIGRAM(S): at 11:24

## 2019-02-16 RX ADMIN — Medication 250 MILLIGRAM(S): at 17:03

## 2019-02-16 RX ADMIN — Medication 500000 UNIT(S): at 11:24

## 2019-02-16 RX ADMIN — CHLORHEXIDINE GLUCONATE 1 APPLICATION(S): 213 SOLUTION TOPICAL at 05:17

## 2019-02-16 RX ADMIN — Medication 1 APPLICATION(S): at 05:17

## 2019-02-16 RX ADMIN — Medication 500000 UNIT(S): at 00:03

## 2019-02-16 RX ADMIN — SODIUM CHLORIDE 1000 MILLILITER(S): 9 INJECTION, SOLUTION INTRAVENOUS at 09:17

## 2019-02-16 NOTE — PROGRESS NOTE ADULT - ASSESSMENT
IMPRESSION:  Faint cellulitis LLE at site of debridement.  No abscess/ OM clinically  No ongoing tonsillitis ( Clinically no enlarged tonsils and no adenopathy )  BCx NTD  Surgical cultures are negative  Decrease in erythema. Resolving cellulitis    RECOMMENDATIONS:  Vancomycin  Will change to po in am

## 2019-02-16 NOTE — PROGRESS NOTE ADULT - ASSESSMENT
39 yo M with intellectual disability p/w fever, sore throat, vomiting, decreased po intake, hypotension with Severe sepsis on admission with Pharyngitis and LLE Ulcer with periwound cellulitis monitored in the ICU for 24 hours never on pressors or intubated, downgraded to medical floor    #Sepsis likely 2/2 LLE cellulitis  -Sepsis resolved  -CXR negative, LE doppler negative for DVT  -CT LLE Cellulitis with subcutaneous edema, no abscess  -Leukocytosis resolved  -Lactic acidosis resolved  -Bx negative x2   - s/p Debridement w/burn 10x5cm blister to the dermis  - c/w nystatin oral solution  - c/w Vancomycin 1G Q12hrs, Per ID may switch to PO in the AM  - c/w Silvadene to LLE BID  -Soap and water qd with bacitracin ointment to open wound and large Tegaderm        #JACKSON likely 2/2 dehydration, resolved  - on admit Cr 2.2, baseline 0.8  - resolved, Cr 0.7    #Hypomagnesemia  repleated  resolved    #Vomiting, resolved      #Intellectual disability  - frequent reorientation  - avoid restraints, previously needed 1:1 in past admits    #MISC  # DVT ppx: heparin  #GI ppx: PPI  # Diet: Dysphagia 3 with thin liquids  #full code  #Dispo: from home with mother  -

## 2019-02-17 LAB
ANION GAP SERPL CALC-SCNC: 14 MMOL/L — SIGNIFICANT CHANGE UP (ref 7–14)
BUN SERPL-MCNC: 8 MG/DL — LOW (ref 10–20)
CALCIUM SERPL-MCNC: 8 MG/DL — LOW (ref 8.5–10.1)
CHLORIDE SERPL-SCNC: 98 MMOL/L — SIGNIFICANT CHANGE UP (ref 98–110)
CO2 SERPL-SCNC: 26 MMOL/L — SIGNIFICANT CHANGE UP (ref 17–32)
CREAT SERPL-MCNC: 0.7 MG/DL — SIGNIFICANT CHANGE UP (ref 0.7–1.5)
CULTURE RESULTS: SIGNIFICANT CHANGE UP
CULTURE RESULTS: SIGNIFICANT CHANGE UP
GLUCOSE SERPL-MCNC: 107 MG/DL — HIGH (ref 70–99)
HCT VFR BLD CALC: 34.5 % — LOW (ref 42–52)
HGB BLD-MCNC: 11.9 G/DL — LOW (ref 14–18)
MAGNESIUM SERPL-MCNC: 2 MG/DL — SIGNIFICANT CHANGE UP (ref 1.8–2.4)
MCHC RBC-ENTMCNC: 30.7 PG — SIGNIFICANT CHANGE UP (ref 27–31)
MCHC RBC-ENTMCNC: 34.5 G/DL — SIGNIFICANT CHANGE UP (ref 32–37)
MCV RBC AUTO: 88.9 FL — SIGNIFICANT CHANGE UP (ref 80–94)
NRBC # BLD: 0 /100 WBCS — SIGNIFICANT CHANGE UP (ref 0–0)
PLATELET # BLD AUTO: 302 K/UL — SIGNIFICANT CHANGE UP (ref 130–400)
POTASSIUM SERPL-MCNC: 4 MMOL/L — SIGNIFICANT CHANGE UP (ref 3.5–5)
POTASSIUM SERPL-SCNC: 4 MMOL/L — SIGNIFICANT CHANGE UP (ref 3.5–5)
RBC # BLD: 3.88 M/UL — LOW (ref 4.7–6.1)
RBC # FLD: 13.2 % — SIGNIFICANT CHANGE UP (ref 11.5–14.5)
SODIUM SERPL-SCNC: 138 MMOL/L — SIGNIFICANT CHANGE UP (ref 135–146)
SPECIMEN SOURCE: SIGNIFICANT CHANGE UP
SPECIMEN SOURCE: SIGNIFICANT CHANGE UP
WBC # BLD: 11.46 K/UL — HIGH (ref 4.8–10.8)
WBC # FLD AUTO: 11.46 K/UL — HIGH (ref 4.8–10.8)

## 2019-02-17 RX ADMIN — Medication 500000 UNIT(S): at 05:08

## 2019-02-17 RX ADMIN — Medication 500000 UNIT(S): at 11:07

## 2019-02-17 RX ADMIN — Medication 1 APPLICATION(S): at 17:07

## 2019-02-17 RX ADMIN — Medication 500000 UNIT(S): at 00:21

## 2019-02-17 RX ADMIN — Medication 500000 UNIT(S): at 17:06

## 2019-02-17 RX ADMIN — Medication 250 MILLIGRAM(S): at 05:06

## 2019-02-17 RX ADMIN — Medication 1 APPLICATION(S): at 05:08

## 2019-02-17 RX ADMIN — ZINC SULFATE TAB 220 MG (50 MG ZINC EQUIVALENT) 220 MILLIGRAM(S): 220 (50 ZN) TAB at 11:08

## 2019-02-17 RX ADMIN — Medication 500 MILLIGRAM(S): at 11:07

## 2019-02-17 RX ADMIN — CHLORHEXIDINE GLUCONATE 1 APPLICATION(S): 213 SOLUTION TOPICAL at 05:06

## 2019-02-17 RX ADMIN — HEPARIN SODIUM 5000 UNIT(S): 5000 INJECTION INTRAVENOUS; SUBCUTANEOUS at 17:07

## 2019-02-17 RX ADMIN — Medication 250 MILLIGRAM(S): at 17:06

## 2019-02-17 RX ADMIN — PANTOPRAZOLE SODIUM 40 MILLIGRAM(S): 20 TABLET, DELAYED RELEASE ORAL at 05:08

## 2019-02-17 RX ADMIN — HEPARIN SODIUM 5000 UNIT(S): 5000 INJECTION INTRAVENOUS; SUBCUTANEOUS at 05:08

## 2019-02-17 RX ADMIN — Medication 1 TABLET(S): at 11:07

## 2019-02-17 RX ADMIN — Medication 500000 UNIT(S): at 23:14

## 2019-02-17 NOTE — PROGRESS NOTE ADULT - REASON FOR ADMISSION
hypotension

## 2019-02-17 NOTE — PROGRESS NOTE ADULT - SUBJECTIVE AND OBJECTIVE BOX
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 breathing and low BP. Never required intubation or pressors, tolerating PO w/o difficulty. Mother went home and was not amenable to phone conversation overnight as per resident and RN. Patient is AAOx2 at baseline, and seems frightened of being in hospital. Not really cooperating with taking clothes off for skin survey.     EVENTS LAST 24HRS:   Patient unable to give full history. But denied pain. Patient sitting in urine soaked pants, refusing to take shoes/socks off.   Not agitated or aggressive. Leaving IVs alone. BP noted to be low but stable, mildly tachy still. Ate breakfast w/o difficulty.       PAST MEDICAL & SURGICAL HISTORY  Vomiting, intractability of vomiting not specified, presence of nausea not specified, unspecified vomiting type  No significant past surgical history      SOCIAL HISTORY:  Unknown given mental status    REVIEW OF SYSTEMS:  See HPI    ALLERGIES:    MEDICATIONS:  STANDING MEDICATIONS  chlorhexidine 4% Liquid 1 Application(s) Topical <User Schedule>  dexamethasone  Injectable 4 milliGRAM(s) IV Push every 8 hours  heparin  Injectable 5000 Unit(s) SubCutaneous every 12 hours  lactated ringers. 1000 milliLiter(s) IV Continuous <Continuous>  meropenem  IVPB 1000 milliGRAM(s) IV Intermittent every 12 hours  nystatin    Suspension 816537 Unit(s) Oral four times a day  pantoprazole    Tablet 40 milliGRAM(s) Oral before breakfast  vancomycin  IVPB 750 milliGRAM(s) IV Intermittent every 12 hours    PRN MEDICATIONS  acetaminophen   Tablet .. 650 milliGRAM(s) Oral every 6 hours PRN  ondansetron Injectable 4 milliGRAM(s) IV Push every 6 hours PRN    VITALS:         ICU Vital Signs Last 24 Hrs:    T(C): 36.7 (2019 08:00), Max: 38.8 (2019 20:00)  T(F): 98 (2019 08:00), Max: 101.9 (2019 20:00)  HR: 110 (2019 10:00) (104 - 123)  BP: 98/56 (2019 10:00) (87/53 - 105/66)  BP(mean): 84 (2019 08:00) (72 - 84)  ABP: --  ABP(mean): --  RR: 22 (2019 10:00) (18 - 26)  SpO2: 97% (2019 10:00) (97% - 100%)          I&O's Detail:      2019 07:  -  2019 07:00  --------------------------------------------------------  IN:    IV PiggyBack: 300 mL    lactated ringers.: 600 mL    Oral Fluid: 20 mL  Total IN: 920 mL    OUT:    Voided: 730 mL  Total OUT: 730 mL    Total NET: 190 mL      2019 07:01  -  2019 11:45  --------------------------------------------------------  IN:    IV PiggyBack: 50 mL    lactated ringers.: 400 mL    Oral Fluid: 200 mL  Total IN: 650 mL    OUT:    Voided: 500 mL  Total OUT: 500 mL    Total NET: 150 mL          LABS:                        11.8   10.94 )-----------( 112      ( 2019 04:35 )             32.2     02-12    138  |  97<L>  |  17  ----------------------------<  119<H>  3.6   |  24  |  1.0    Ca    8.2<L>      2019 04:35  Mg     1.4         TPro  6.2  /  Alb  3.6  /  TBili  0.9  /  DBili  x   /  AST  37  /  ALT  12  /  AlkPhos  63  02-          CAPILLARY BLOOD GLUCOSE          Urinalysis Basic - ( 2019 01:00 )    Color: Yellow / Appearance: Clear / S.010 / pH: x  Gluc: x / Ketone: Trace  / Bili: Negative / Urobili: 1.0 mg/dL   Blood: x / Protein: Trace mg/dL / Nitrite: Negative   Leuk Esterase: Negative / RBC: 6-10 /HPF / WBC x   Sq Epi: x / Non Sq Epi: x / Bacteria: x      CULTURES:      PHYSICAL EXAM:    General: No acute distress.  Alert, interactive, nonfocal. AAOx1-2  HEENT: Pupils equal, reactive to light symmetrically. pharyngeal erythema without a small amount of white patches on back of throat w/o significant copious discharge  PULM: Clear to auscultation bilaterally, no significant sputum production. patent airway, no stridor  CVS: sinus tach at 110s and regular rhythm, no murmurs  ABD: Soft, nondistended, nontender, no masses. pants noted to be soiled w/ urine  EXT: 1+ edema over rash on L extremity, seems to be mildly tender as well (withdrawing)  SKIN: Warm and well perfused, tender patch of 10cm x 5cm of erythema over L shin with bullae with yellowish fluid in it, a few small white ulcer patches noted over ankle w/o discharge, no foul smell, R extremity w/o rash, no foot ulcers noted when shoes removed with RN's help;     RADIOLOGY:
SUBJECTIVE:    Patient is a 38y old Male who presents with a chief complaint of hypotension (14 Feb 2019 09:35)    Currently admitted to medicine with the primary diagnosis of Severe Sepsis 2/2 LLE Cellulitis and Pharyngitis     Today is hospital day 3d. This morning he is resting comfortably in bed and reports no new issues or overnight events.  No shortness of breath, fevers, chills, abdominal pain, nausea, emesis. LLE extremity pain controlled.    PAST MEDICAL & SURGICAL HISTORY  Vomiting, intractability of vomiting not specified, presence of nausea not specified, unspecified vomiting type  No significant past surgical history    ALLERGIES:  No Known Allergies    MEDICATIONS:  STANDING MEDICATIONS  BACItracin   Ointment 1 Application(s) Topical two times a day  ceFAZolin   IVPB 2000 milliGRAM(s) IV Intermittent every 8 hours  ceFAZolin   IVPB      chlorhexidine 4% Liquid 1 Application(s) Topical <User Schedule>  heparin  Injectable 5000 Unit(s) SubCutaneous every 12 hours  magnesium sulfate  IVPB 2 Gram(s) IV Intermittent once  nystatin    Suspension 793097 Unit(s) Oral four times a day  pantoprazole    Tablet 40 milliGRAM(s) Oral before breakfast  silver sulfADIAZINE 1% Cream 1 Application(s) Topical every 12 hours    PRN MEDICATIONS  acetaminophen   Tablet .. 650 milliGRAM(s) Oral every 6 hours PRN  ondansetron Injectable 4 milliGRAM(s) IV Push every 6 hours PRN    VITALS:   T(F): 97.3  HR: 84  BP: 104/60  RR: 18  SpO2: --    LABS:                        11.1   17.23 )-----------( 118      ( 14 Feb 2019 05:21 )             30.4     02-14    141  |  101  |  15  ----------------------------<  120<H>  3.9   |  27  |  0.7    Ca    8.1<L>      14 Feb 2019 05:21  Mg     1.7     02-14      Lactate, Blood: 1.8 mmol/L (02-14-19 @ 05:21)  Lactate, Blood: 1.9 mmol/L (02-13-19 @ 17:11)      Culture - Other (collected 13 Feb 2019 04:33)  Source: .Other left leg wound  Preliminary Report (14 Feb 2019 09:21):    No growth    Culture - Urine (collected 12 Feb 2019 01:00)  Source: .Urine Clean Catch (Midstream)  Final Report (13 Feb 2019 11:54):    No growth    Culture - Blood (collected 11 Feb 2019 18:35)  Source: .Blood Blood  Preliminary Report (13 Feb 2019 02:09):    No growth to date.    Culture - Blood (collected 11 Feb 2019 18:35)  Source: .Blood Blood  Preliminary Report (13 Feb 2019 02:09):    No growth to date.      RADIOLOGY:  < from: Xray Chest 1 View AP/PA (02.11.19 @ 23:38) >    Impression:      No radiographic evidence of acute cardiopulmonary disease.    < end of copied text >    < from: [facility] Duplex Lower Ext Vein Scan, Bilat (02.12.19 @ 12:17) >  Impression:    No evidence of deep venous thrombosis or superficial thrombophlebitis in   bilateral lower extremities.    < end of copied text >    PHYSICAL EXAM:  GEN: No acute distress, tall thin, intelectually disabled, friendly  HEENT: No nasal discharge, Pharynx with erythema no exudates, no peritonsillar swelling  LUNGS: Clear to auscultation bilaterally   HEART: S1/S2 present. RRR.   ABD: Soft, non-tender, non-distended. Bowel sounds present  EXT: NC/NC/NE/2+PP/CASTILLO/ LLE 10x4cm Ulcer s/p debridement with periwound cellulitis possibly tracking further up the LLE just below the knee  NEURO: AAOX1, follows some commands, can express needs, CN 2-12 grossly intact  Intravenous access:   NG tube:   Akhtar cathter:
SUBJECTIVE:    Patient is a 38y old Male who presents with a chief complaint of hypotension (15 Feb 2019 08:34)    Currently admitted to medicine with the primary diagnosis of Severe Sepsis 2/2 LLE Cellulitis     Today is hospital day 4d. This morning he is resting comfortably in bed and reports no new issues or overnight events.  No shortness of breath, fevers, chills, abdominal pain, nausea, emesis. LLE extremity pain controlled. May go for redebridement today with burn    PAST MEDICAL & SURGICAL HISTORY  Vomiting, intractability of vomiting not specified, presence of nausea not specified, unspecified vomiting type  No significant past surgical history    ALLERGIES:  No Known Allergies    MEDICATIONS:  STANDING MEDICATIONS  ascorbic acid 500 milliGRAM(s) Oral daily  BACItracin   Ointment 1 Application(s) Topical two times a day  chlorhexidine 4% Liquid 1 Application(s) Topical <User Schedule>  heparin  Injectable 5000 Unit(s) SubCutaneous every 12 hours  nystatin    Suspension 886558 Unit(s) Oral four times a day  pantoprazole    Tablet 40 milliGRAM(s) Oral before breakfast  sodium chloride 0.9%. 1000 milliLiter(s) IV Continuous <Continuous>  vancomycin  IVPB 1000 milliGRAM(s) IV Intermittent every 12 hours  zinc sulfate 220 milliGRAM(s) Oral daily    PRN MEDICATIONS  acetaminophen   Tablet .. 650 milliGRAM(s) Oral every 6 hours PRN  ondansetron Injectable 4 milliGRAM(s) IV Push every 6 hours PRN  < from: Xray Chest 1 View AP/PA (02.11.19 @ 23:38) >    Impression:      No radiographic evidence of acute cardiopulmonary disease.    < end of copied text >    < from: [facility] Duplex Lower Ext Vein Scan, Bilat (02.12.19 @ 12:17) >  Impression:    No evidence of deep venous thrombosis or superficial thrombophlebitis in   bilateral lower extremities.    < end of copied text >  VITALS:   T(F): 97.4  HR: 90  BP: 97/53  RR: 18  SpO2: --    LABS:                        11.2   10.82 )-----------( 155      ( 15 Feb 2019 06:18 )             32.1     02-15    141  |  101  |  10  ----------------------------<  81  3.8   |  27  |  0.6<L>    Ca    7.6<L>      15 Feb 2019 06:18  Mg     2.2     02-15    Culture - Other (collected 13 Feb 2019 04:33)  Source: .Other left leg wound  Preliminary Report (14 Feb 2019 09:21):    No growth    RADIOLOGY:  < from: Xray Chest 1 View AP/PA (02.11.19 @ 23:38) >    Impression:      No radiographic evidence of acute cardiopulmonary disease.    < end of copied text >    < from: [facility] Duplex Lower Ext Vein Scan, Bilat (02.12.19 @ 12:17) >  Impression:    No evidence of deep venous thrombosis or superficial thrombophlebitis in   bilateral lower extremities.    < end of copied text >    PHYSICAL EXAM:  GEN: No acute distress, tall thin, intelectually disabled, friendly  HEENT: No nasal discharge, Pharynx with erythema no exudates, no peritonsillar swelling  LUNGS: Clear to auscultation bilaterally   HEART: S1/S2 present. RRR.   ABD: Soft, non-tender, non-distended. Bowel sounds present  EXT: NC/NC/NE/2+PP/CASTILLO/ LLE 10x4cm Ulcer s/p debridement with periwound cellulitis possibly tracking further up the LLE just below the knee- stable from yesterday  NEURO: AAOX1, follows some commands, can express needs, CN 2-12 grossly intact    Intravenous access:   NG tube:   Akhtar cathter:
SUBJECTIVE:    Patient is a 38y old Male who presents with a chief complaint of hypotension (16 Feb 2019 07:45)    Currently admitted to medicine with the primary diagnosis of Severe Sepsis 2/2 LLE Cellulitis     Today is hospital day 5d. This morning he is resting comfortably in bed and reports no new issues or overnight events. Pain controlled, overnight a little hypotensive, then came back to baseline.    PAST MEDICAL & SURGICAL HISTORY  Vomiting, intractability of vomiting not specified, presence of nausea not specified, unspecified vomiting type  No significant past surgical history    SOCIAL HISTORY:  Negative for smoking/alcohol/drug use.     ALLERGIES:  No Known Allergies    MEDICATIONS:  STANDING MEDICATIONS  ascorbic acid 500 milliGRAM(s) Oral daily  BACItracin   Ointment 1 Application(s) Topical two times a day  chlorhexidine 4% Liquid 1 Application(s) Topical <User Schedule>  heparin  Injectable 5000 Unit(s) SubCutaneous every 12 hours  multivitamin 1 Tablet(s) Oral daily  nystatin    Suspension 582513 Unit(s) Oral four times a day  pantoprazole    Tablet 40 milliGRAM(s) Oral before breakfast  silver sulfADIAZINE 1% Cream 1 Application(s) Topical two times a day  sodium chloride 0.9%. 1000 milliLiter(s) IV Continuous <Continuous>  vancomycin  IVPB 1000 milliGRAM(s) IV Intermittent every 12 hours  zinc sulfate 220 milliGRAM(s) Oral daily    PRN MEDICATIONS  acetaminophen   Tablet .. 650 milliGRAM(s) Oral every 6 hours PRN  ondansetron Injectable 4 milliGRAM(s) IV Push every 6 hours PRN    VITALS:   T(F): 96.9  HR: 85  BP: 92/63  RR: 18  SpO2: --    LABS:                        11.2   10.41 )-----------( 211      ( 16 Feb 2019 05:46 )             31.3     02-16    139  |  102  |  10  ----------------------------<  93  3.7   |  26  |  0.6<L>    Ca    7.6<L>      16 Feb 2019 05:46  Phos  2.5     02-16  Mg     2.0     02-16    RADIOLOGY:  < from: Xray Chest 1 View AP/PA (02.11.19 @ 23:38) >    Impression:      No radiographic evidence of acute cardiopulmonary disease.    < end of copied text >    < from: [facility] Duplex Lower Ext Vein Scan, Bilat (02.12.19 @ 12:17) >  Impression:    No evidence of deep venous thrombosis or superficial thrombophlebitis in   bilateral lower extremities.    < end of copied text >    < from: CT Lower Extremity w/ IV Cont, Left (02.14.19 @ 19:39) >  Impression:    No acute osseous abnormality.    Cellulitis of the left calf, with confluent deep subcutaneous edema   bordering the muscular fascia and mild intermuscular edema, without   rim-enhancing abscess.    < end of copied text >      PHYSICAL EXAM:  GEN: No acute distress, tall thin, intelectually disabled, friendly  HEENT: No nasal discharge, Pharynx with erythema no exudates, no peritonsillar swelling  LUNGS: Clear to auscultation bilaterally   HEART: S1/S2 present. RRR.   ABD: Soft, non-tender, non-distended. Bowel sounds present  EXT: NC/NC/NE/2+PP/CASTILLO/ LLE 10x4cm Ulcer s/p debridement with periwound cellulitis LLE just below the knee- stable from yesterday  NEURO: AAOX1, follows some commands, can express needs, CN 2-12 grossly intact    Intravenous access:   NG tube:   Akhtar cathter:
SUBJECTIVE:    Patient is a 38y old Male who presents with a chief complaint of hypotension (2019 12:48)    Currently admitted to medicine with the primary diagnosis of Severe Sepsis 2/2 LLE Cellulitis and Pharyngitis     Today is hospital day 2d. This morning he is resting comfortably in bed and reports no new issues or overnight events. States that he is tolerating PO diet and his pain is controlled. No shortness of breath, abdominal pain, nausea, emesis.     PAST MEDICAL & SURGICAL HISTORY  Vomiting, intractability of vomiting not specified, presence of nausea not specified, unspecified vomiting type  No significant past surgical history    ALLERGIES:  No Known Allergies    MEDICATIONS:  STANDING MEDICATIONS  BACItracin   Ointment 1 Application(s) Topical two times a day  ceFAZolin   IVPB 2000 milliGRAM(s) IV Intermittent once  ceFAZolin   IVPB 2000 milliGRAM(s) IV Intermittent every 8 hours  ceFAZolin   IVPB      chlorhexidine 4% Liquid 1 Application(s) Topical <User Schedule>  dexamethasone  Injectable 4 milliGRAM(s) IV Push every 8 hours  heparin  Injectable 5000 Unit(s) SubCutaneous every 12 hours  lactated ringers. 1000 milliLiter(s) IV Continuous <Continuous>  nystatin    Suspension 285603 Unit(s) Oral four times a day  pantoprazole    Tablet 40 milliGRAM(s) Oral before breakfast    PRN MEDICATIONS  acetaminophen   Tablet .. 650 milliGRAM(s) Oral every 6 hours PRN  ondansetron Injectable 4 milliGRAM(s) IV Push every 6 hours PRN    VITALS:   T(F): 97.6  HR: 85  BP: 104/64  RR: 16  SpO2: 99%    LABS:                        12.5   19.17 )-----------( 132      ( 2019 08:31 )             34.1     02-13    135  |  93<L>  |  14  ----------------------------<  173<H>  3.7   |  26  |  0.7    Ca    8.4<L>      2019 08:31  Mg     1.9     02-13    TPro  6.2  /  Alb  3.6  /  TBili  0.9  /  DBili  x   /  AST  37  /  ALT  12  /  AlkPhos  63  02-11      Urinalysis Basic - ( 2019 01:00 )    Color: Yellow / Appearance: Clear / S.010 / pH: x  Gluc: x / Ketone: Trace  / Bili: Negative / Urobili: 1.0 mg/dL   Blood: x / Protein: Trace mg/dL / Nitrite: Negative   Leuk Esterase: Negative / RBC: 6-10 /HPF / WBC x   Sq Epi: x / Non Sq Epi: x / Bacteria: x    Lactate, Blood: 2.5 mmol/L <H> (19 @ 08:31)    Culture - Urine (collected 2019 01:00)  Source: .Urine Clean Catch (Midstream)  Final Report (2019 11:54):    No growth    Culture - Blood (collected 2019 18:35)  Source: .Blood Blood  Preliminary Report (2019 02:09):    No growth to date.    Culture - Blood (collected 2019 18:35)  Source: .Blood Blood  Preliminary Report (2019 02:09):    No growth to date.    RADIOLOGY:  < from: Xray Chest 1 View AP/PA (19 @ 23:38) >    Impression:      No radiographic evidence of acute cardiopulmonary disease.    < end of copied text >    < from: [facility] Duplex Lower Ext Vein Scan, Bilat (19 @ 12:17) >  Impression:    No evidence of deep venous thrombosis or superficial thrombophlebitis in   bilateral lower extremities.    < end of copied text >      PHYSICAL EXAM:  GEN: No acute distress, tall thin, intelectually disabled, friendly  HEENT: No nasal discharge, Pharynx with erythema no exudates, no peritonsillar swelling  LUNGS: Clear to auscultation bilaterally   HEART: S1/S2 present. RRR.   ABD: Soft, non-tender, non-distended. Bowel sounds present  EXT: NC/NC/NE/2+PP/CASTILLO/ LLE 10x4cm Ulcer s/p debridement with periwound cellulitis  NEURO: AAOX1, follows some commands, can express needs, CN 2-12 grossly intact    Intravenous access:   NG tube:   Akhtar cathter:

## 2019-02-17 NOTE — PROGRESS NOTE ADULT - PROVIDER SPECIALTY LIST ADULT
Burn
Burn
Critical Care
Hospitalist
Infectious Disease
Internal Medicine
Pulmonology
Infectious Disease

## 2019-02-17 NOTE — PROGRESS NOTE ADULT - EXTREMITIES COMMENTS
LLE with edema/ faint erythema around debrided bulla
LLE with increase in erythema
LLE with reduced erythema/ no purulence/ minimal edema
LLE with less erythema, no purulence

## 2019-02-17 NOTE — PROGRESS NOTE ADULT - ASSESSMENT
39 yo M with intellectual disability here with Severe sepsis 2/2 strep pharyngitis and LLE purulent cellulitis s/p debridement, hypomagnesemia (improved)    responding nicely to the IV vanco with local wound care- still with residual edema mostly inferiorly at this point  CT leg demonstrates deep infection but no drainable abscess  no OR debridement planned at this time- will c/w local wound care per updated burn recommendation  will probably deescalate to PO doxy tomorrow per ID recommendation  c/w dvt ppx, us duplex has been negative

## 2019-02-17 NOTE — PROGRESS NOTE ADULT - ASSESSMENT
IMPRESSION:  Faint cellulitis LLE at site of debridement.  No abscess/ OM clinically  No ongoing tonsillitis ( Clinically no enlarged tonsils and no adenopathy )  BCx NTD  Surgical cultures are negative  Decrease in erythema. Resolving cellulitis    RECOMMENDATIONS:  Could change to po Doxycycline 100 mg q12h for 7 days on discharge

## 2019-02-17 NOTE — PROGRESS NOTE ADULT - GASTROINTESTINAL
Soft, non-tender, no hepatosplenomegaly, normal bowel sounds
detailed exam
Soft, non-tender, no hepatosplenomegaly, normal bowel sounds
Soft, non-tender, no hepatosplenomegaly, normal bowel sounds

## 2019-02-18 ENCOUNTER — TRANSCRIPTION ENCOUNTER (OUTPATIENT)
Age: 39
End: 2019-02-18

## 2019-02-18 VITALS
RESPIRATION RATE: 17 BRPM | TEMPERATURE: 97 F | HEART RATE: 83 BPM | DIASTOLIC BLOOD PRESSURE: 58 MMHG | SYSTOLIC BLOOD PRESSURE: 100 MMHG

## 2019-02-18 LAB
ANION GAP SERPL CALC-SCNC: 11 MMOL/L — SIGNIFICANT CHANGE UP (ref 7–14)
BASOPHILS # BLD AUTO: 0.02 K/UL — SIGNIFICANT CHANGE UP (ref 0–0.2)
BASOPHILS NFR BLD AUTO: 0.2 % — SIGNIFICANT CHANGE UP (ref 0–1)
BUN SERPL-MCNC: 9 MG/DL — LOW (ref 10–20)
CALCIUM SERPL-MCNC: 8.4 MG/DL — LOW (ref 8.5–10.1)
CHLORIDE SERPL-SCNC: 103 MMOL/L — SIGNIFICANT CHANGE UP (ref 98–110)
CO2 SERPL-SCNC: 24 MMOL/L — SIGNIFICANT CHANGE UP (ref 17–32)
CREAT SERPL-MCNC: 0.7 MG/DL — SIGNIFICANT CHANGE UP (ref 0.7–1.5)
EOSINOPHIL # BLD AUTO: 0.17 K/UL — SIGNIFICANT CHANGE UP (ref 0–0.7)
EOSINOPHIL NFR BLD AUTO: 1.8 % — SIGNIFICANT CHANGE UP (ref 0–8)
GLUCOSE SERPL-MCNC: 107 MG/DL — HIGH (ref 70–99)
HCT VFR BLD CALC: 37.1 % — LOW (ref 42–52)
HGB BLD-MCNC: 12.7 G/DL — LOW (ref 14–18)
IMM GRANULOCYTES NFR BLD AUTO: 0.9 % — HIGH (ref 0.1–0.3)
LYMPHOCYTES # BLD AUTO: 1.61 K/UL — SIGNIFICANT CHANGE UP (ref 1.2–3.4)
LYMPHOCYTES # BLD AUTO: 17.2 % — LOW (ref 20.5–51.1)
MAGNESIUM SERPL-MCNC: 2.2 MG/DL — SIGNIFICANT CHANGE UP (ref 1.8–2.4)
MCHC RBC-ENTMCNC: 30.6 PG — SIGNIFICANT CHANGE UP (ref 27–31)
MCHC RBC-ENTMCNC: 34.2 G/DL — SIGNIFICANT CHANGE UP (ref 32–37)
MCV RBC AUTO: 89.4 FL — SIGNIFICANT CHANGE UP (ref 80–94)
MONOCYTES # BLD AUTO: 0.52 K/UL — SIGNIFICANT CHANGE UP (ref 0.1–0.6)
MONOCYTES NFR BLD AUTO: 5.5 % — SIGNIFICANT CHANGE UP (ref 1.7–9.3)
NEUTROPHILS # BLD AUTO: 6.98 K/UL — HIGH (ref 1.4–6.5)
NEUTROPHILS NFR BLD AUTO: 74.4 % — SIGNIFICANT CHANGE UP (ref 42.2–75.2)
NRBC # BLD: 0 /100 WBCS — SIGNIFICANT CHANGE UP (ref 0–0)
PHOSPHATE SERPL-MCNC: 2.6 MG/DL — SIGNIFICANT CHANGE UP (ref 2.1–4.9)
PLATELET # BLD AUTO: 361 K/UL — SIGNIFICANT CHANGE UP (ref 130–400)
POTASSIUM SERPL-MCNC: 4.5 MMOL/L — SIGNIFICANT CHANGE UP (ref 3.5–5)
POTASSIUM SERPL-SCNC: 4.5 MMOL/L — SIGNIFICANT CHANGE UP (ref 3.5–5)
RBC # BLD: 4.15 M/UL — LOW (ref 4.7–6.1)
RBC # FLD: 13.4 % — SIGNIFICANT CHANGE UP (ref 11.5–14.5)
SODIUM SERPL-SCNC: 138 MMOL/L — SIGNIFICANT CHANGE UP (ref 135–146)
WBC # BLD: 9.38 K/UL — SIGNIFICANT CHANGE UP (ref 4.8–10.8)
WBC # FLD AUTO: 9.38 K/UL — SIGNIFICANT CHANGE UP (ref 4.8–10.8)

## 2019-02-18 RX ORDER — ASCORBIC ACID 60 MG
1 TABLET,CHEWABLE ORAL
Qty: 30 | Refills: 0 | OUTPATIENT
Start: 2019-02-18 | End: 2019-03-19

## 2019-02-18 RX ORDER — ZINC SULFATE TAB 220 MG (50 MG ZINC EQUIVALENT) 220 (50 ZN) MG
1 TAB ORAL
Qty: 30 | Refills: 0 | OUTPATIENT
Start: 2019-02-18 | End: 2019-03-19

## 2019-02-18 RX ADMIN — PANTOPRAZOLE SODIUM 40 MILLIGRAM(S): 20 TABLET, DELAYED RELEASE ORAL at 05:04

## 2019-02-18 RX ADMIN — Medication 100 MILLIGRAM(S): at 09:24

## 2019-02-18 RX ADMIN — Medication 1 TABLET(S): at 11:02

## 2019-02-18 RX ADMIN — CHLORHEXIDINE GLUCONATE 1 APPLICATION(S): 213 SOLUTION TOPICAL at 05:03

## 2019-02-18 RX ADMIN — Medication 500000 UNIT(S): at 11:02

## 2019-02-18 RX ADMIN — Medication 500000 UNIT(S): at 05:04

## 2019-02-18 RX ADMIN — Medication 1 APPLICATION(S): at 05:04

## 2019-02-18 RX ADMIN — HEPARIN SODIUM 5000 UNIT(S): 5000 INJECTION INTRAVENOUS; SUBCUTANEOUS at 05:04

## 2019-02-18 RX ADMIN — ZINC SULFATE TAB 220 MG (50 MG ZINC EQUIVALENT) 220 MILLIGRAM(S): 220 (50 ZN) TAB at 11:02

## 2019-02-18 RX ADMIN — Medication 500 MILLIGRAM(S): at 11:02

## 2019-02-18 RX ADMIN — Medication 250 MILLIGRAM(S): at 05:03

## 2019-02-18 NOTE — CHART NOTE - NSCHARTNOTEFT_GEN_A_CORE
<<<RESIDENT DISCHARGE NOTE>>>     CRISTINA SANTO  MRN-[id number]    VITAL SIGNS:  T(F): 96.9 (02-18-19 @ 13:00), Max: 97.2 (02-18-19 @ 04:40)  HR: 83 (02-18-19 @ 13:00)  BP: 100/58 (02-18-19 @ 13:00)  SpO2: --      PHYSICAL EXAMINATION:  GEN: No acute distress, tall thin, intelectually disabled, friendly  HEENT: No nasal discharge, Pharynx with erythema no exudates, no peritonsillar swelling  LUNGS: Clear to auscultation bilaterally   HEART: S1/S2 present. RRR.   ABD: Soft, non-tender, non-distended. Bowel sounds present  EXT: NC/NC/NE/2+PP/CASTILLO/ LLE 10x4cm Ulcer s/p debridement with periwound cellulitis LLE just below the knee- stable from yesterday  NEURO: AAOX1, follows some commands, can express needs, CN 2-12 grossly intact    TEST RESULTS:                        12.7   9.38  )-----------( 361      ( 18 Feb 2019 06:37 )             37.1       02-18    138  |  103  |  9<L>  ----------------------------<  107<H>  4.5   |  24  |  0.7    Ca    8.4<L>      18 Feb 2019 06:37  Phos  2.6     02-18  Mg     2.2     02-18        FINAL DISCHARGE INTERVIEW:  Resident(s) Present: (Name:_____Neno LUI________)  DISCHARGE MEDICATION RECONCILIATION  reviewed with Attending (Name:Aislinn_Blanca LUI_________)    DISPOSITION:   [ x ] Home,    [  ] Home with Visiting Nursing Services,   [    ]  SNF/ NH,    [   ] Acute Rehab (4A),   [   ] Other (Specify:_________)

## 2019-02-18 NOTE — SWALLOW BEDSIDE ASSESSMENT ADULT - COMMENTS
dysphagia follow up conducted bedside. pt received alert, responsive. pt with dysarthria of speech. normal breath patterns, on room air. no c/o pain, discomfort noted. +toleration mild oral dysphagia moderate oral dysphagia

## 2019-02-18 NOTE — DISCHARGE NOTE ADULT - MEDICATION SUMMARY - MEDICATIONS TO TAKE
I will START or STAY ON the medications listed below when I get home from the hospital:    doxycycline monohydrate 100 mg oral capsule  -- 1 cap(s) by mouth every 12 hours  -- Indication: For Cellulitis    zinc sulfate 220 mg oral capsule  -- 1 cap(s) by mouth once a day  -- Indication: For Cellulitis    ascorbic acid 500 mg oral tablet  -- 1 tab(s) by mouth once a day  -- Indication: For Cellulitis

## 2019-02-18 NOTE — DISCHARGE NOTE ADULT - CARE PLAN
Principal Discharge DX:	Cellulitis of lower extremity, unspecified laterality  Goal:	Follow up with Dr. Villasenor in 2 weeks. Continue wound care with bacitracin ointment, adaptic and dry wrap. Soap and water once a day with bacitracin ointment to open wound and large Tegaderm. Continue elevation and light compression.  Assessment and plan of treatment:	Follow up with Dr. Villasenor in 2 weeks. Continue wound care with bacitracin ointment, adaptic and dry wrap. Soap and water once a day with bacitracin ointment to open wound and large Tegaderm. Continue elevation and light compression. Principal Discharge DX:	Cellulitis of lower extremity, unspecified laterality  Goal:	Follow up with Dr. Villasenor in 2 weeks. Continue wound care with bacitracin ointment, adaptic and dry wrap. Soap and water once a day with bacitracin ointment to open wound and large Tegaderm. Continue elevation and light compression.  Assessment and plan of treatment:	Follow up with Dr. Villasenor in 2 weeks. Continue wound care with bacitracin ointment, adaptic and dry wrap. Soap and water once a day with bacitracin ointment to open wound and large Tegaderm. Continue elevation and light compression.  Secondary Diagnosis:	Sepsis, due to unspecified organism  Goal:	Secondary to deep purulent cellulitis of left lower extremity. Status post surgical debridement.  Assessment and plan of treatment:	Sepsis present on admission. Resolved with IV antibiotics therapy and surgical debridement.

## 2019-02-18 NOTE — DISCHARGE NOTE ADULT - PATIENT PORTAL LINK FT
You can access the [facility], offered by [facility], by registering with the following website: [email or web address]

## 2019-02-18 NOTE — SWALLOW BEDSIDE ASSESSMENT ADULT - ORAL PHASE
Within functional limits Delayed oral transit time/Decreased anterior-posterior movement of the bolus Decreased anterior-posterior movement of the bolus/Delayed oral transit time

## 2019-02-18 NOTE — DISCHARGE NOTE ADULT - CARE PROVIDERS DIRECT ADDRESSES
[email or web address]

## 2019-02-18 NOTE — DISCHARGE NOTE ADULT - ADDITIONAL INSTRUCTIONS
Follow up with Dr. Villasenor in 2 weeks. Continue wound care with bacitracin ointment, adaptic and dry wrap. Soap and water once a day with bacitracin ointment to open wound and large Tegaderm. Continue elevation and light compression.
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
## 2019-02-18 NOTE — DISCHARGE NOTE ADULT - CARE PROVIDER_API CALL
Earl Villasenor)  Plastic Surgery  [address] [place] [address]  Phone: [phone]  Fax: [phone]  Follow Up Time:     Magnolia Salinas)  Geriatric Medicine; Internal Medicine  [address], [place] [id number]  Phone: [phone]  Fax: [phone]  Follow Up Time:

## 2019-02-18 NOTE — DISCHARGE NOTE ADULT - PLAN OF CARE
Follow up with Dr. Villasenor in 2 weeks. Continue wound care with bacitracin ointment, adaptic and dry wrap. Soap and water once a day with bacitracin ointment to open wound and large Tegaderm. Continue elevation and light compression. Secondary to deep purulent cellulitis of left lower extremity. Status post surgical debridement. Sepsis present on admission. Resolved with IV antibiotics therapy and surgical debridement.

## 2019-02-18 NOTE — SWALLOW BEDSIDE ASSESSMENT ADULT - SWALLOW EVAL: DIAGNOSIS
+toleration of  thins, Dysphagia Diet II mechanical soft consistency with ground meat, mild oral dysphagia for Dysphagia Diet III Mechanical soft consistency with cut up meats . moderate oral dysphagia for regular

## 2019-03-07 ENCOUNTER — APPOINTMENT (OUTPATIENT)
Dept: BURN CARE | Facility: CLINIC | Age: 39
End: 2019-03-07

## 2019-03-07 ENCOUNTER — OUTPATIENT (OUTPATIENT)
Dept: OUTPATIENT SERVICES | Facility: HOSPITAL | Age: 39
LOS: 1 days | Discharge: HOME | End: 2019-03-07

## 2019-03-07 NOTE — PHYSICAL EXAM
[Closed] : closed [Size%: ______] : Size: [unfilled]% [Infected?] : Infected: No [Abnormal] : abnormal [Medium] : medium [] : no [de-identified] : left lower leg wound healed--> moisturizer
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
## 2019-03-21 DIAGNOSIS — S81.802A UNSPECIFIED OPEN WOUND, LEFT LOWER LEG, INITIAL ENCOUNTER: ICD-10-CM
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
## 2020-01-03 ENCOUNTER — TRANSCRIPTION ENCOUNTER (OUTPATIENT)
Age: 40
End: 2020-01-03

## 2020-01-06 ENCOUNTER — TRANSCRIPTION ENCOUNTER (OUTPATIENT)
Age: 40
End: 2020-01-06

## 2020-01-16 ENCOUNTER — OUTPATIENT (OUTPATIENT)
Dept: OUTPATIENT SERVICES | Facility: HOSPITAL | Age: 40
LOS: 1 days | Discharge: HOME | End: 2020-01-16

## 2020-01-16 ENCOUNTER — APPOINTMENT (OUTPATIENT)
Dept: BURN CARE | Facility: CLINIC | Age: 40
End: 2020-01-16
Payer: MEDICARE

## 2020-01-16 DIAGNOSIS — S81.802A UNSPECIFIED OPEN WOUND, LEFT LOWER LEG, INITIAL ENCOUNTER: ICD-10-CM

## 2020-01-16 PROCEDURE — 99213 OFFICE O/P EST LOW 20 MIN: CPT | Mod: 25

## 2020-01-16 PROCEDURE — 16025 DRESS/DEBRID P-THICK BURN M: CPT

## 2020-01-19 PROBLEM — S81.802A LEG WOUND, LEFT: Status: ACTIVE | Noted: 2019-03-07

## 2020-01-21 DIAGNOSIS — S81.802A UNSPECIFIED OPEN WOUND, LEFT LOWER LEG, INITIAL ENCOUNTER: ICD-10-CM

## 2020-02-20 ENCOUNTER — APPOINTMENT (OUTPATIENT)
Dept: BURN CARE | Facility: CLINIC | Age: 40
End: 2020-02-20

## 2020-03-07 ENCOUNTER — APPOINTMENT (OUTPATIENT)
Dept: INTERNAL MEDICINE | Facility: CLINIC | Age: 40
End: 2020-03-07

## 2020-04-01 ENCOUNTER — APPOINTMENT (OUTPATIENT)
Dept: INTERNAL MEDICINE | Facility: CLINIC | Age: 40
End: 2020-04-01

## 2021-04-23 ENCOUNTER — TRANSCRIPTION ENCOUNTER (OUTPATIENT)
Age: 41
End: 2021-04-23

## 2023-04-27 ENCOUNTER — APPOINTMENT (OUTPATIENT)
Dept: INTERNAL MEDICINE | Facility: CLINIC | Age: 43
End: 2023-04-27
Payer: MEDICARE

## 2023-04-27 ENCOUNTER — OUTPATIENT (OUTPATIENT)
Dept: OUTPATIENT SERVICES | Facility: HOSPITAL | Age: 43
LOS: 1 days | End: 2023-04-27
Payer: MEDICARE

## 2023-04-27 VITALS
OXYGEN SATURATION: 99 % | DIASTOLIC BLOOD PRESSURE: 72 MMHG | BODY MASS INDEX: 29.78 KG/M2 | WEIGHT: 208 LBS | HEART RATE: 69 BPM | TEMPERATURE: 95.1 F | HEIGHT: 70 IN | SYSTOLIC BLOOD PRESSURE: 105 MMHG

## 2023-04-27 DIAGNOSIS — Z00.00 ENCOUNTER FOR GENERAL ADULT MEDICAL EXAMINATION WITHOUT ABNORMAL FINDINGS: ICD-10-CM

## 2023-04-27 DIAGNOSIS — Z00.00 ENCOUNTER FOR GENERAL ADULT MEDICAL EXAMINATION W/OUT ABNORMAL FINDINGS: ICD-10-CM

## 2023-04-27 DIAGNOSIS — R62.50 UNSPECIFIED LACK OF EXPECTED NORMAL PHYSIOLOGICAL DEVELOPMENT IN CHILDHOOD: ICD-10-CM

## 2023-04-27 PROCEDURE — 80053 COMPREHEN METABOLIC PANEL: CPT

## 2023-04-27 PROCEDURE — 99202 OFFICE O/P NEW SF 15 MIN: CPT | Mod: GC

## 2023-04-27 PROCEDURE — 80061 LIPID PANEL: CPT

## 2023-04-27 PROCEDURE — 99202 OFFICE O/P NEW SF 15 MIN: CPT

## 2023-04-27 PROCEDURE — 84443 ASSAY THYROID STIM HORMONE: CPT

## 2023-04-27 PROCEDURE — 85027 COMPLETE CBC AUTOMATED: CPT

## 2023-04-27 PROCEDURE — 83036 HEMOGLOBIN GLYCOSYLATED A1C: CPT
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
## 2023-04-27 NOTE — HISTORY OF PRESENT ILLNESS
[Parent] : parent [FreeTextEntry1] : establish care [de-identified] : 43 yo male, ho mental retardation, barely verbal at baseline, ho undescended testicles s.p surgery at 3 years, LLE wound repair, presents to establish care. Patient is barely responsive to questions. History taken from his mother.\par Patient is calm at home, no anxious or angry behavior, no night time awakening no nightmares.\par She reports good appetite, good oral intake, good sleep hygiene, no blood in urine or stool. No falls, no vomiting , no nausea no diarrhea\par No seizures, no fever , sputum or cough

## 2023-04-27 NOTE — PHYSICAL EXAM
[No Acute Distress] : no acute distress [No JVD] : no jugular venous distention [No Respiratory Distress] : no respiratory distress  [No Accessory Muscle Use] : no accessory muscle use [Clear to Auscultation] : lungs were clear to auscultation bilaterally [Normal Rate] : normal rate  [Normal S1, S2] : normal S1 and S2 [No Murmur] : no murmur heard [No Focal Deficits] : no focal deficits [de-identified] : LLE healed wound non infected ; skin darkening  [de-identified] : minimally verbal, cognitive delay- at baseline per mother

## 2023-04-27 NOTE — REVIEW OF SYSTEMS
[Fever] : no fever [Chills] : no chills [Recent Change In Weight] : ~T no recent weight change [Nosebleeds] : no nosebleeds [Nasal Discharge] : no nasal discharge [Wheezing] : no wheezing [Cough] : no cough [Constipation] : no constipation [Diarrhea] : no diarrhea [Vomiting] : no vomiting [Melena] : no melena [Incontinence] : no incontinence

## 2023-05-03 DIAGNOSIS — R62.50 UNSPECIFIED LACK OF EXPECTED NORMAL PHYSIOLOGICAL DEVELOPMENT IN CHILDHOOD: ICD-10-CM

## 2023-05-03 DIAGNOSIS — Z00.00 ENCOUNTER FOR GENERAL ADULT MEDICAL EXAMINATION WITHOUT ABNORMAL FINDINGS: ICD-10-CM

## 2023-05-03 LAB
ALBUMIN SERPL ELPH-MCNC: 4.4 G/DL
ALP BLD-CCNC: 145 U/L
ALT SERPL-CCNC: 13 U/L
ANION GAP SERPL CALC-SCNC: 11 MMOL/L
AST SERPL-CCNC: 15 U/L
BASOPHILS # BLD AUTO: 0.03 K/UL
BASOPHILS NFR BLD AUTO: 0.6 %
BILIRUB SERPL-MCNC: 0.3 MG/DL
BUN SERPL-MCNC: 17 MG/DL
CALCIUM SERPL-MCNC: 9.3 MG/DL
CHLORIDE SERPL-SCNC: 104 MMOL/L
CHOLEST SERPL-MCNC: 180 MG/DL
CO2 SERPL-SCNC: 24 MMOL/L
CREAT SERPL-MCNC: 0.8 MG/DL
EGFR: 113 ML/MIN/1.73M2
EOSINOPHIL # BLD AUTO: 0.05 K/UL
EOSINOPHIL NFR BLD AUTO: 1 %
ESTIMATED AVERAGE GLUCOSE: 103 MG/DL
GLUCOSE SERPL-MCNC: 95 MG/DL
HBA1C MFR BLD HPLC: 5.2 %
HCT VFR BLD CALC: 43.1 %
HDLC SERPL-MCNC: 39 MG/DL
HGB BLD-MCNC: 14.5 G/DL
IMM GRANULOCYTES NFR BLD AUTO: 0.4 %
LDLC SERPL CALC-MCNC: 130 MG/DL
LYMPHOCYTES # BLD AUTO: 1.21 K/UL
LYMPHOCYTES NFR BLD AUTO: 24.2 %
MAN DIFF?: NORMAL
MCHC RBC-ENTMCNC: 29.7 PG
MCHC RBC-ENTMCNC: 33.6 G/DL
MCV RBC AUTO: 88.1 FL
MONOCYTES # BLD AUTO: 0.45 K/UL
MONOCYTES NFR BLD AUTO: 9 %
NEUTROPHILS # BLD AUTO: 3.25 K/UL
NEUTROPHILS NFR BLD AUTO: 64.8 %
NONHDLC SERPL-MCNC: 141 MG/DL
PLATELET # BLD AUTO: 267 K/UL
POTASSIUM SERPL-SCNC: 4.5 MMOL/L
PROT SERPL-MCNC: 7 G/DL
RBC # BLD: 4.89 M/UL
RBC # FLD: 14.2 %
SODIUM SERPL-SCNC: 139 MMOL/L
TRIGL SERPL-MCNC: 53 MG/DL
TSH SERPL-ACNC: 1.66 UIU/ML
WBC # FLD AUTO: 5.01 K/UL

## 2023-05-04 ENCOUNTER — NON-APPOINTMENT (OUTPATIENT)
Age: 43
End: 2023-05-04

## 2023-09-24 ENCOUNTER — NON-APPOINTMENT (OUTPATIENT)
Age: 43
End: 2023-09-24
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
## 2024-05-07 NOTE — ED ADULT NURSE NOTE - NSFALLRSKOUTCOME_ED_ALL_ED
"Patient seen 5/3/24. Per note: \"significant increase in his A1c. Will add on metformin now, encourage diet, exercise. Repeat A1c in 3 months.\"     Per A1C result: \"This has really gotten a lot worse. I will send in another med. High sugars can make the neuropathy worse.\"    Was patient only to be prescribed Metformin or another medication along with Metformin? Only Metformin was sent.     Melissa Bhatti CMA on 5/7/2024 at 10:09 AM    "
Pt requested to have Dr Chavez call regarding his Metformin. Pt states that him and Dr Chavez discussed switching his medication, but when the pt went to pick his medication up from the pharm, it was the wrong medication. Please call as soon as possible    Josefina Gamez on 5/6/2024 at 2:31 PM    
Universal Safety Interventions

## 2024-09-18 NOTE — ED PROVIDER NOTE - CRITICAL CARE INDICATION, MLM
patient was critically ill... Patient was critically ill with a high probability of imminent or life threatening deterioration. 97.7

## 2024-11-11 NOTE — SWALLOW BEDSIDE ASSESSMENT ADULT - ASR SWALLOW LABIAL MOBILITY
Patient has Est care with another [facility] DOCTOR BUT NOT UNTIL JAN 2025    Was patient of Dr. Hanna      within functional limits

## 2025-08-20 ENCOUNTER — APPOINTMENT (OUTPATIENT)
Dept: INTERNAL MEDICINE | Facility: CLINIC | Age: 45
End: 2025-08-20